# Patient Record
Sex: FEMALE | Race: WHITE | Employment: FULL TIME | URBAN - NONMETROPOLITAN AREA
[De-identification: names, ages, dates, MRNs, and addresses within clinical notes are randomized per-mention and may not be internally consistent; named-entity substitution may affect disease eponyms.]

---

## 2017-03-13 ENCOUNTER — HOSPITAL ENCOUNTER (OUTPATIENT)
Dept: WOMENS IMAGING | Age: 62
Discharge: HOME | End: 2017-03-13
Attending: NURSE PRACTITIONER | Admitting: NURSE PRACTITIONER
Payer: COMMERCIAL

## 2017-03-13 DIAGNOSIS — Z13.9 SCREENING: ICD-10-CM

## 2017-03-13 PROCEDURE — G0202 SCR MAMMO BI INCL CAD: HCPCS

## 2017-04-21 ENCOUNTER — TELEPHONE (OUTPATIENT)
Dept: GASTROENTEROLOGY | Age: 62
End: 2017-04-21

## 2017-04-21 DIAGNOSIS — Z12.11 COLON CANCER SCREENING: Primary | ICD-10-CM

## 2017-11-07 ENCOUNTER — ANESTHESIA (OUTPATIENT)
Dept: OPERATING ROOM | Age: 62
End: 2017-11-07
Payer: COMMERCIAL

## 2017-11-07 ENCOUNTER — ANESTHESIA EVENT (OUTPATIENT)
Dept: OPERATING ROOM | Age: 62
End: 2017-11-07
Payer: COMMERCIAL

## 2017-11-07 ENCOUNTER — HOSPITAL ENCOUNTER (OUTPATIENT)
Age: 62
Setting detail: OUTPATIENT SURGERY
Discharge: HOME | End: 2017-11-07
Attending: INTERNAL MEDICINE | Admitting: INTERNAL MEDICINE
Payer: COMMERCIAL

## 2017-11-07 VITALS — SYSTOLIC BLOOD PRESSURE: 106 MMHG | OXYGEN SATURATION: 98 % | DIASTOLIC BLOOD PRESSURE: 63 MMHG

## 2017-11-07 VITALS
TEMPERATURE: 98.5 F | WEIGHT: 255 LBS | BODY MASS INDEX: 48.15 KG/M2 | DIASTOLIC BLOOD PRESSURE: 71 MMHG | RESPIRATION RATE: 18 BRPM | SYSTOLIC BLOOD PRESSURE: 119 MMHG | HEART RATE: 62 BPM | HEIGHT: 61 IN | OXYGEN SATURATION: 96 %

## 2017-11-07 PROCEDURE — 7100000010 HC PHASE II RECOVERY - FIRST 15 MIN: Performed by: INTERNAL MEDICINE

## 2017-11-07 PROCEDURE — 3609027000 HC COLONOSCOPY: Performed by: INTERNAL MEDICINE

## 2017-11-07 PROCEDURE — 2580000003 HC RX 258: Performed by: INTERNAL MEDICINE

## 2017-11-07 PROCEDURE — 3700000001 HC ADD 15 MINUTES (ANESTHESIA): Performed by: INTERNAL MEDICINE

## 2017-11-07 PROCEDURE — 45378 DIAGNOSTIC COLONOSCOPY: CPT | Performed by: INTERNAL MEDICINE

## 2017-11-07 PROCEDURE — 6360000002 HC RX W HCPCS: Performed by: NURSE ANESTHETIST, CERTIFIED REGISTERED

## 2017-11-07 PROCEDURE — 3700000000 HC ANESTHESIA ATTENDED CARE: Performed by: INTERNAL MEDICINE

## 2017-11-07 PROCEDURE — 7100000011 HC PHASE II RECOVERY - ADDTL 15 MIN: Performed by: INTERNAL MEDICINE

## 2017-11-07 PROCEDURE — 2500000003 HC RX 250 WO HCPCS: Performed by: NURSE ANESTHETIST, CERTIFIED REGISTERED

## 2017-11-07 RX ORDER — PRAVASTATIN SODIUM 40 MG
40 TABLET ORAL DAILY
COMMUNITY

## 2017-11-07 RX ORDER — PROPOFOL 10 MG/ML
INJECTION, EMULSION INTRAVENOUS CONTINUOUS PRN
Status: DISCONTINUED | OUTPATIENT
Start: 2017-11-07 | End: 2017-11-07 | Stop reason: SDUPTHER

## 2017-11-07 RX ORDER — PROPOFOL 10 MG/ML
INJECTION, EMULSION INTRAVENOUS PRN
Status: DISCONTINUED | OUTPATIENT
Start: 2017-11-07 | End: 2017-11-07 | Stop reason: SDUPTHER

## 2017-11-07 RX ORDER — LIDOCAINE HYDROCHLORIDE 20 MG/ML
INJECTION, SOLUTION INFILTRATION; PERINEURAL PRN
Status: DISCONTINUED | OUTPATIENT
Start: 2017-11-07 | End: 2017-11-07 | Stop reason: SDUPTHER

## 2017-11-07 RX ORDER — SODIUM CHLORIDE, SODIUM LACTATE, POTASSIUM CHLORIDE, CALCIUM CHLORIDE 600; 310; 30; 20 MG/100ML; MG/100ML; MG/100ML; MG/100ML
INJECTION, SOLUTION INTRAVENOUS CONTINUOUS
Status: DISCONTINUED | OUTPATIENT
Start: 2017-11-07 | End: 2017-11-07 | Stop reason: HOSPADM

## 2017-11-07 RX ADMIN — SODIUM CHLORIDE, POTASSIUM CHLORIDE, SODIUM LACTATE AND CALCIUM CHLORIDE: 600; 310; 30; 20 INJECTION, SOLUTION INTRAVENOUS at 07:40

## 2017-11-07 RX ADMIN — PROPOFOL 50 MG: 10 INJECTION, EMULSION INTRAVENOUS at 07:57

## 2017-11-07 RX ADMIN — LIDOCAINE HYDROCHLORIDE 40 MG: 20 INJECTION, SOLUTION INFILTRATION; PERINEURAL at 07:57

## 2017-11-07 RX ADMIN — PROPOFOL 200 MCG/KG/MIN: 10 INJECTION, EMULSION INTRAVENOUS at 07:57

## 2017-11-07 ASSESSMENT — PAIN SCALES - GENERAL
PAINLEVEL_OUTOF10: 0

## 2017-11-07 NOTE — OP NOTE
PROCEDURE NOTE    DATE OF PROCEDURE: 11/7/2017    ENDOSCOPIST: Paula Lee. Jamarcus Storm MD, [facility]    ASSISTANT: None    PREOPERATIVE DIAGNOSIS: screening for colon cancer    POSTOPERATIVE DIAGNOSIS: hemorrhoids internal, Small in size    OPERATION: Colonoscopy --screening    ANESTHESIA: MAC     ESTIMATED BLOOD LOSS: No    COMPLICATIONS: None. SPECIMENS: were not obtained    HISTORY: The patient is a 62y.o. year old female with history of above preop diagnosis. Colonoscopy with possible biopsy or polypectomy has been recommended and I explained the risk, benefits, expected outcome, and alternatives to the procedure. Risks include but are not limited to bleeding, infection, respiratory distress, hypotension, and perforation of the colon. The patient understands and is in agreement. PROCEDURE: The patient was given monitored anesthesia care. The patient was given oxygen by nasal cannula. The colonoscope was inserted per rectum and advanced under direct vision to the cecum without difficulty. Findings:  Cecum/Ascending colon: normal    Transverse colon: normal    Descending/Sigmoid colon: normal    Rectum/Anus: examined in normal and retroflexed positions and was abnormal: hemorrhoids    The colon was decompressed and the scope was removed. The patient tolerated the procedure well. Current guidelines call for a repeat colonoscopy in 10 years.       Electronically signed by Elie Corbin MD  on 11/7/2017 at 8:19 AM

## 2017-11-07 NOTE — PROGRESS NOTES
Discharge Criteria    Inpatients must meet Criteria 1 through 7. All other patients are either YES or N/A. If a NO is chosen then Anesthesia or Surgeon must be notified. 1.  Minimum 30 minutes after last dose of sedative medication, minimum 120 minutes after last dose of reversal agent. Yes      2. Systolic BP stable within 20 mmHg for 30 minutes & systolic BP between 90 & 129 or within 10 mmHg of baseline. Yes      3. Pulse between 60 and 100 or within 10 bpm of baseline. Yes      4. Spontaneous respiratory rate >/= 10 per minute. Yes      5. SaO2 >/= 95 or  >/= baseline. Yes      6. Able to cough and swallow or return to baseline function. Yes      7. Alert and oriented or return to baseline mental status. Yes      8. Demonstrates controlled, coordinated movements, ambulates with steady gait, or return to baseline activity function. Yes      9. Minimal or no pain or nausea, or at a level tolerable and acceptable to patient. Yes      10. Takes and retains oral fluids as allowed. N/A      11. Procedural / perioperative site stable. Minimal or no bleeding. Yes          12. If GI endoscopy procedure, minimal or no abdominal distention or passing flatus. Yes      13. Written discharge instructions and emergency telephone number provided. Yes      14. Accompanied by a responsible adult. Yes      Adult patient discharged from facility without responsible person meets above criteria plus the following:   a) remains awake without stimulus for 30 minutes     b) oriented appropriate for age      c) all vital signs stable   d) no significant risk of losing protective reflexes      e) able to maintain pre-procedure mobility without assistance   f) no nausea or dizziness      g) transportation arrangements that do not require patient to operate motor Vehicle.      N/A

## 2017-11-07 NOTE — ANESTHESIA PRE PROCEDURE
Department of Anesthesiology  Preprocedure Note       Name:  Anuj Flanagan   Age:  62 y.o.  :  1955                                          MRN:  [id number]         Date:  2017      Surgeon: Darlin Mckeon):  Alcira Kraus MD    Procedure: Procedure(s):  COLONOSCOPY    Medications prior to admission:   Prior to Admission medications    Medication Sig Start Date End Date Taking? Authorizing Provider   pravastatin (PRAVACHOL) 40 MG tablet Take 40 mg by mouth daily   Yes Historical Provider, MD   Multiple Vitamins-Minerals (MULTIVITAMIN ADULT PO) Take by mouth daily   Yes Historical Provider, MD   chlorthalidone (HYGROTON) 25 MG tablet Take 25 mg by mouth daily   Yes Historical Provider, MD   atenolol (TENORMIN) 50 MG tablet Take 50 mg by mouth daily   Yes Historical Provider, MD   citalopram (CELEXA) 40 MG tablet Take 40 mg by mouth daily   Yes Historical Provider, MD   omeprazole (PRILOSEC) 20 MG capsule Take 20 mg by mouth Daily   Yes Historical Provider, MD   levothyroxine (SYNTHROID) 150 MCG tablet Take 150 mcg by mouth daily   Yes Historical Provider, MD   metFORMIN (GLUCOPHAGE) 500 MG tablet Take 500 mg by mouth 2 times daily (with meals)   Yes Historical Provider, MD   loratadine (CLARITIN) 10 MG tablet Take 10 mg by mouth daily    Historical Provider, MD   tiZANidine (ZANAFLEX) 4 MG tablet Take 4 mg by mouth every 6 hours as needed    Historical Provider, MD       Current medications:    Current Facility-Administered Medications   Medication Dose Route Frequency Provider Last Rate Last Dose    lactated ringers infusion   Intravenous Continuous Alcira Kraus MD           Allergies:     Allergies   Allergen Reactions    Amoxicillin     Ampicillin     Bactrim [Sulfamethoxazole-Trimethoprim]     Sulfa Antibiotics     Trimethoprim        Problem List:    Patient Active Problem List   Diagnosis Code    Colon cancer screening Z12.11       Past Medical History:        Diagnosis Date    Arthritis  Hyperlipidemia     Thyroid disease        Past Surgical History:        Procedure Laterality Date    ARM SURGERY Right 1997       Social History:    Social History   Substance Use Topics    Smoking status: Never Smoker    Smokeless tobacco: Never Used    Alcohol use Yes      Comment: occasional                                Counseling given: Not Answered      Vital Signs (Current):   Vitals:    11/07/17 0730   BP: 120/81   Pulse: 72   Resp: 20   Temp: 36.1 °C (96.9 °F)   TempSrc: Temporal   SpO2: 95%   Weight: 255 lb (115.7 kg)   Height: 5' 1\" (1.549 m)                                              BP Readings from Last 3 Encounters:   11/07/17 120/81       NPO Status: Time of last liquid consumption: 0300                        Time of last solid consumption: 1730                        Date of last liquid consumption: 11/06/17                        Date of last solid food consumption: 11/04/17    BMI:   Wt Readings from Last 3 Encounters:   11/07/17 255 lb (115.7 kg)     Body mass index is 48.18 kg/m². CBC: No results found for: WBC, RBC, HGB, HCT, MCV, RDW, PLT    CMP: No results found for: NA, K, CL, CO2, BUN, CREATININE, GFRAA, AGRATIO, LABGLOM, GLUCOSE, PROT, CALCIUM, BILITOT, ALKPHOS, AST, ALT    POC Tests: No results for input(s): POCGLU, POCNA, POCK, POCCL, POCBUN, POCHEMO, POCHCT in the last 72 hours.     Coags: No results found for: PROTIME, INR, APTT    HCG (If Applicable): No results found for: PREGTESTUR, PREGSERUM, HCG, HCGQUANT     ABGs: No results found for: PHART, PO2ART, FFI0RJC, IUI7GTT, BEART, S3JLUSWK     Type & Screen (If Applicable):  No results found for: LABABO, [address]    Anesthesia Evaluation  Patient summary reviewed  Airway: Mallampati: II  TM distance: >3 FB   Neck ROM: full  Mouth opening: > = 3 FB Dental:          Pulmonary: breath sounds clear to auscultation                             Cardiovascular:  Exercise tolerance: good (>4 METS),           Rhythm: regular  Rate:

## 2017-11-07 NOTE — H&P
History and Physical    Patient's Name/Date of Birth: Judith Toussaint / 1955 (49 y.o.)    Date: November 7, 2017     CHIEF COMPLAINT:  screening for colon cancer      Past Medical History:   Diagnosis Date    Arthritis     Hyperlipidemia     Thyroid disease      Past Surgical History:   Procedure Laterality Date    ARM SURGERY Right 1997     Current Facility-Administered Medications   Medication Dose Route Frequency Provider Last Rate Last Dose    lactated ringers infusion   Intravenous Continuous Bridget Gray MD         Allergies   Allergen Reactions    Amoxicillin     Ampicillin     Bactrim [Sulfamethoxazole-Trimethoprim]     Sulfa Antibiotics     Trimethoprim      History reviewed. No pertinent family history. Social History     Social History    Marital status:      Spouse name: N/A    Number of children: N/A    Years of education: N/A     Occupational History    Not on file. Social History Main Topics    Smoking status: Never Smoker    Smokeless tobacco: Never Used    Alcohol use Yes      Comment: occasional    Drug use: No    Sexual activity: Not on file     Other Topics Concern    Not on file     Social History Narrative    No narrative on file     ROS: Non-contributory    Physical Exam:  Vitals:    11/07/17 0730   BP: 120/81   Pulse: 72   Resp: 20   Temp: 96.9 °F (36.1 °C)   SpO2: 95%       Chest: Breath sounds were clear and equal with no rales, wheezes, or rhonchi. Respiratory effort was normal with no retractions or use of accessory muscles. Cardiovascular: Heart sounds were normal with a regular rate and rhythm. There were no murmurs, gallops or rubs. Abdomen: Bowel sounds were normal.  The abdomen was soft and non distended. There was no tenderness, guarding, rebound, or rigidity. There were no masses, hepatosplenomegaly, or hernias.     Plan: Colonoscopy      Electronically by Bridget Gray MD  on 11/7/2017 at 7:42 AM

## 2018-01-22 ENCOUNTER — HOSPITAL ENCOUNTER (OUTPATIENT)
Dept: NON INVASIVE DIAGNOSTICS | Age: 63
Discharge: HOME | End: 2018-01-22
Payer: COMMERCIAL

## 2018-01-22 LAB
LV EF: 60 %
LVEF MODALITY: NORMAL

## 2018-01-22 PROCEDURE — 93306 TTE W/DOPPLER COMPLETE: CPT

## 2018-03-19 ENCOUNTER — HOSPITAL ENCOUNTER (OUTPATIENT)
Dept: WOMENS IMAGING | Age: 63
Discharge: HOME | End: 2018-03-21
Payer: COMMERCIAL

## 2018-03-19 DIAGNOSIS — Z12.39 SCREENING BREAST EXAMINATION: ICD-10-CM

## 2018-03-19 PROCEDURE — 77067 SCR MAMMO BI INCL CAD: CPT

## 2019-04-15 ENCOUNTER — HOSPITAL ENCOUNTER (OUTPATIENT)
Dept: WOMENS IMAGING | Age: 64
Discharge: HOME | End: 2019-04-17
Payer: COMMERCIAL

## 2019-04-15 ENCOUNTER — HOSPITAL ENCOUNTER (OUTPATIENT)
Dept: BONE DENSITY | Age: 64
Discharge: HOME | End: 2019-04-17
Payer: COMMERCIAL

## 2019-04-15 DIAGNOSIS — Z78.0 MENOPAUSE: ICD-10-CM

## 2019-04-15 DIAGNOSIS — Z12.39 BREAST CANCER SCREENING OTHER THAN MAMMOGRAM: ICD-10-CM

## 2019-04-15 PROCEDURE — 77063 BREAST TOMOSYNTHESIS BI: CPT

## 2019-04-15 PROCEDURE — 77080 DXA BONE DENSITY AXIAL: CPT

## 2020-05-26 ENCOUNTER — HOSPITAL ENCOUNTER (OUTPATIENT)
Dept: WOMENS IMAGING | Age: 65
Discharge: HOME | End: 2020-05-28
Payer: COMMERCIAL

## 2020-05-26 PROCEDURE — 77067 SCR MAMMO BI INCL CAD: CPT

## 2020-10-26 ENCOUNTER — HOSPITAL ENCOUNTER (OUTPATIENT)
Dept: LAB | Age: 65
Discharge: HOME | End: 2020-10-26
Payer: COMMERCIAL

## 2020-10-26 LAB
ABSOLUTE EOS #: 0.17 K/UL (ref 0–0.44)
ABSOLUTE IMMATURE GRANULOCYTE: 0.03 K/UL (ref 0–0.3)
ABSOLUTE LYMPH #: 1.86 K/UL (ref 1.1–3.7)
ABSOLUTE MONO #: 0.6 K/UL (ref 0.1–1.2)
ALBUMIN SERPL-MCNC: 4 G/DL (ref 3.5–5.2)
ALBUMIN/GLOBULIN RATIO: 1.3 (ref 1–2.5)
ALP BLD-CCNC: 74 U/L (ref 35–104)
ALT SERPL-CCNC: 15 U/L (ref 5–33)
ANION GAP SERPL CALCULATED.3IONS-SCNC: 11 MMOL/L (ref 9–17)
AST SERPL-CCNC: 19 U/L
BASOPHILS # BLD: 1 % (ref 0–2)
BASOPHILS ABSOLUTE: 0.06 K/UL (ref 0–0.2)
BILIRUB SERPL-MCNC: 0.51 MG/DL (ref 0.3–1.2)
BUN BLDV-MCNC: 15 MG/DL (ref 8–23)
BUN/CREAT BLD: 29 (ref 9–20)
CALCIUM SERPL-MCNC: 8.8 MG/DL (ref 8.6–10.4)
CHLORIDE BLD-SCNC: 102 MMOL/L (ref 98–107)
CHOLESTEROL/HDL RATIO: 3.5
CHOLESTEROL: 167 MG/DL
CO2: 28 MMOL/L (ref 20–31)
CREAT SERPL-MCNC: 0.52 MG/DL (ref 0.5–0.9)
DIFFERENTIAL TYPE: ABNORMAL
EOSINOPHILS RELATIVE PERCENT: 2 % (ref 1–4)
FOLATE: 18.8 NG/ML
GFR AFRICAN AMERICAN: >60 ML/MIN
GFR NON-AFRICAN AMERICAN: >60 ML/MIN
GFR SERPL CREATININE-BSD FRML MDRD: ABNORMAL ML/MIN/{1.73_M2}
GFR SERPL CREATININE-BSD FRML MDRD: ABNORMAL ML/MIN/{1.73_M2}
GLUCOSE BLD-MCNC: 101 MG/DL (ref 70–99)
HCT VFR BLD CALC: 42.1 % (ref 36.3–47.1)
HDLC SERPL-MCNC: 48 MG/DL
HEMOGLOBIN: 12.8 G/DL (ref 11.9–15.1)
IMMATURE GRANULOCYTES: 0 %
LDL CHOLESTEROL: 80 MG/DL (ref 0–130)
LYMPHOCYTES # BLD: 21 % (ref 24–43)
MAGNESIUM: 1.9 MG/DL (ref 1.6–2.6)
MCH RBC QN AUTO: 26.8 PG (ref 25.2–33.5)
MCHC RBC AUTO-ENTMCNC: 30.4 G/DL (ref 28.4–34.8)
MCV RBC AUTO: 88.1 FL (ref 82.6–102.9)
MONOCYTES # BLD: 7 % (ref 3–12)
NRBC AUTOMATED: 0 PER 100 WBC
PDW BLD-RTO: 14.7 % (ref 11.8–14.4)
PLATELET # BLD: 362 K/UL (ref 138–453)
PLATELET ESTIMATE: ABNORMAL
PMV BLD AUTO: 11 FL (ref 8.1–13.5)
POTASSIUM SERPL-SCNC: 3.6 MMOL/L (ref 3.7–5.3)
RBC # BLD: 4.78 M/UL (ref 3.95–5.11)
RBC # BLD: ABNORMAL 10*6/UL
SEG NEUTROPHILS: 69 % (ref 36–65)
SEGMENTED NEUTROPHILS ABSOLUTE COUNT: 6.05 K/UL (ref 1.5–8.1)
SODIUM BLD-SCNC: 141 MMOL/L (ref 135–144)
THYROXINE, FREE: 1.66 NG/DL (ref 0.93–1.7)
TOTAL PROTEIN: 7.2 G/DL (ref 6.4–8.3)
TRIGL SERPL-MCNC: 196 MG/DL
TSH SERPL DL<=0.05 MIU/L-ACNC: 0.91 MIU/L (ref 0.3–5)
VITAMIN B-12: 1292 PG/ML (ref 232–1245)
VLDLC SERPL CALC-MCNC: ABNORMAL MG/DL (ref 1–30)
WBC # BLD: 8.8 K/UL (ref 3.5–11.3)
WBC # BLD: ABNORMAL 10*3/UL

## 2020-10-26 PROCEDURE — 83735 ASSAY OF MAGNESIUM: CPT

## 2020-10-26 PROCEDURE — 82746 ASSAY OF FOLIC ACID SERUM: CPT

## 2020-10-26 PROCEDURE — 84443 ASSAY THYROID STIM HORMONE: CPT

## 2020-10-26 PROCEDURE — 85025 COMPLETE CBC W/AUTO DIFF WBC: CPT

## 2020-10-26 PROCEDURE — 36415 COLL VENOUS BLD VENIPUNCTURE: CPT

## 2020-10-26 PROCEDURE — 80061 LIPID PANEL: CPT

## 2020-10-26 PROCEDURE — 80053 COMPREHEN METABOLIC PANEL: CPT

## 2020-10-26 PROCEDURE — 82607 VITAMIN B-12: CPT

## 2020-10-26 PROCEDURE — 84439 ASSAY OF FREE THYROXINE: CPT

## 2020-11-18 ENCOUNTER — HOSPITAL ENCOUNTER (OUTPATIENT)
Dept: LAB | Age: 65
Setting detail: SPECIMEN
Discharge: HOME | End: 2020-11-18
Payer: COMMERCIAL

## 2020-11-18 PROCEDURE — C9803 HOPD COVID-19 SPEC COLLECT: HCPCS

## 2020-11-18 PROCEDURE — U0003 INFECTIOUS AGENT DETECTION BY NUCLEIC ACID (DNA OR RNA); SEVERE ACUTE RESPIRATORY SYNDROME CORONAVIRUS 2 (SARS-COV-2) (CORONAVIRUS DISEASE [COVID-19]), AMPLIFIED PROBE TECHNIQUE, MAKING USE OF HIGH THROUGHPUT TECHNOLOGIES AS DESCRIBED BY CMS-2020-01-R: HCPCS

## 2020-11-22 LAB — SARS-COV-2, NAA: DETECTED

## 2020-11-23 ENCOUNTER — TELEPHONE (OUTPATIENT)
Dept: PEDIATRICS | Age: 65
End: 2020-11-23

## 2020-11-23 ENCOUNTER — TELEPHONE (OUTPATIENT)
Dept: ADMINISTRATIVE | Age: 65
End: 2020-11-23

## 2021-05-27 ENCOUNTER — HOSPITAL ENCOUNTER (OUTPATIENT)
Dept: WOMENS IMAGING | Age: 66
Discharge: HOME | End: 2021-05-29
Payer: COMMERCIAL

## 2021-05-27 DIAGNOSIS — Z12.31 BREAST CANCER SCREENING BY MAMMOGRAM: ICD-10-CM

## 2021-05-27 PROCEDURE — 77063 BREAST TOMOSYNTHESIS BI: CPT

## 2022-03-15 ENCOUNTER — HOSPITAL ENCOUNTER (OUTPATIENT)
Dept: ULTRASOUND IMAGING | Age: 67
Discharge: HOME | End: 2022-03-17
Payer: MEDICARE

## 2022-03-15 ENCOUNTER — HOSPITAL ENCOUNTER (OUTPATIENT)
Age: 67
Discharge: HOME | End: 2022-03-15
Payer: MEDICARE

## 2022-03-15 DIAGNOSIS — Z00.00 ROUTINE GENERAL MEDICAL EXAMINATION AT A HEALTH CARE FACILITY: ICD-10-CM

## 2022-03-15 PROCEDURE — 93005 ELECTROCARDIOGRAM TRACING: CPT

## 2022-03-15 PROCEDURE — 76706 US ABDL AORTA SCREEN AAA: CPT

## 2022-03-16 LAB
EKG ATRIAL RATE: 67 BPM
EKG P AXIS: 17 DEGREES
EKG P-R INTERVAL: 132 MS
EKG Q-T INTERVAL: 426 MS
EKG QRS DURATION: 88 MS
EKG QTC CALCULATION (BAZETT): 450 MS
EKG R AXIS: -15 DEGREES
EKG T AXIS: 21 DEGREES
EKG VENTRICULAR RATE: 67 BPM

## 2022-06-06 ENCOUNTER — HOSPITAL ENCOUNTER (OUTPATIENT)
Dept: WOMENS IMAGING | Age: 67
Discharge: HOME | End: 2022-06-08
Payer: MEDICARE

## 2022-06-06 DIAGNOSIS — Z12.31 BREAST CANCER SCREENING BY MAMMOGRAM: ICD-10-CM

## 2022-06-06 PROCEDURE — 77063 BREAST TOMOSYNTHESIS BI: CPT

## 2023-06-08 ENCOUNTER — HOSPITAL ENCOUNTER (OUTPATIENT)
Dept: WOMENS IMAGING | Age: 68
Discharge: HOME | End: 2023-06-10
Payer: MEDICARE

## 2023-06-08 DIAGNOSIS — Z12.31 BREAST CANCER SCREENING BY MAMMOGRAM: ICD-10-CM

## 2023-06-08 PROCEDURE — 77063 BREAST TOMOSYNTHESIS BI: CPT

## 2024-04-24 ENCOUNTER — HOSPITAL ENCOUNTER (OUTPATIENT)
Dept: PREADMISSION TESTING | Age: 69
Discharge: HOME | End: 2024-04-24
Attending: ORTHOPAEDIC SURGERY | Admitting: ORTHOPAEDIC SURGERY
Payer: COMMERCIAL

## 2024-04-24 VITALS
BODY MASS INDEX: 49.09 KG/M2 | OXYGEN SATURATION: 96 % | SYSTOLIC BLOOD PRESSURE: 121 MMHG | HEIGHT: 61 IN | RESPIRATION RATE: 18 BRPM | TEMPERATURE: 97.1 F | WEIGHT: 260 LBS | HEART RATE: 68 BPM | DIASTOLIC BLOOD PRESSURE: 69 MMHG

## 2024-04-24 DIAGNOSIS — Z01.818 PREOP TESTING: Primary | ICD-10-CM

## 2024-04-24 LAB
ABO + RH BLD: NORMAL
ANION GAP SERPL CALCULATED.3IONS-SCNC: 12 MMOL/L (ref 9–17)
ARM BAND NUMBER: NORMAL
BASOPHILS # BLD: 0.05 K/UL (ref 0–0.2)
BASOPHILS NFR BLD: 1 % (ref 0–2)
BLOOD BANK SAMPLE EXPIRATION: NORMAL
BLOOD GROUP ANTIBODIES SERPL: NEGATIVE
BUN SERPL-MCNC: 16 MG/DL (ref 8–23)
BUN/CREAT SERPL: 32 (ref 9–20)
CALCIUM SERPL-MCNC: 9.1 MG/DL (ref 8.6–10.4)
CHLORIDE SERPL-SCNC: 98 MMOL/L (ref 98–107)
CO2 SERPL-SCNC: 29 MMOL/L (ref 20–31)
CREAT SERPL-MCNC: 0.5 MG/DL (ref 0.5–0.9)
EOSINOPHIL # BLD: 0.12 K/UL (ref 0–0.44)
EOSINOPHILS RELATIVE PERCENT: 1 % (ref 1–4)
ERYTHROCYTE [DISTWIDTH] IN BLOOD BY AUTOMATED COUNT: 14.2 % (ref 11.8–14.4)
GFR SERPL CREATININE-BSD FRML MDRD: >90 ML/MIN/1.73M2
GLUCOSE SERPL-MCNC: 109 MG/DL (ref 70–99)
HCT VFR BLD AUTO: 45 % (ref 36.3–47.1)
HGB BLD-MCNC: 14.3 G/DL (ref 11.9–15.1)
IMM GRANULOCYTES # BLD AUTO: 0.03 K/UL (ref 0–0.3)
IMM GRANULOCYTES NFR BLD: 0 %
LYMPHOCYTES NFR BLD: 2.08 K/UL (ref 1.1–3.7)
LYMPHOCYTES RELATIVE PERCENT: 25 % (ref 24–43)
MCH RBC QN AUTO: 27.9 PG (ref 25.2–33.5)
MCHC RBC AUTO-ENTMCNC: 31.8 G/DL (ref 28.4–34.8)
MCV RBC AUTO: 87.9 FL (ref 82.6–102.9)
MONOCYTES NFR BLD: 0.58 K/UL (ref 0.1–1.2)
MONOCYTES NFR BLD: 7 % (ref 3–12)
NEUTROPHILS NFR BLD: 66 % (ref 36–65)
NEUTS SEG NFR BLD: 5.55 K/UL (ref 1.5–8.1)
NRBC BLD-RTO: 0 PER 100 WBC
PLATELET # BLD AUTO: 417 K/UL (ref 138–453)
PMV BLD AUTO: 11.2 FL (ref 8.1–13.5)
POTASSIUM SERPL-SCNC: 3.7 MMOL/L (ref 3.7–5.3)
RBC # BLD AUTO: 5.12 M/UL (ref 3.95–5.11)
SODIUM SERPL-SCNC: 139 MMOL/L (ref 135–144)
WBC OTHER # BLD: 8.4 K/UL (ref 3.5–11.3)

## 2024-04-24 PROCEDURE — 93005 ELECTROCARDIOGRAM TRACING: CPT | Performed by: ORTHOPAEDIC SURGERY

## 2024-04-24 PROCEDURE — 86850 RBC ANTIBODY SCREEN: CPT

## 2024-04-24 PROCEDURE — 86901 BLOOD TYPING SEROLOGIC RH(D): CPT

## 2024-04-24 PROCEDURE — 85025 COMPLETE CBC W/AUTO DIFF WBC: CPT

## 2024-04-24 PROCEDURE — 36415 COLL VENOUS BLD VENIPUNCTURE: CPT

## 2024-04-24 PROCEDURE — 86900 BLOOD TYPING SEROLOGIC ABO: CPT

## 2024-04-24 PROCEDURE — 87641 MR-STAPH DNA AMP PROBE: CPT

## 2024-04-24 PROCEDURE — 80048 BASIC METABOLIC PNL TOTAL CA: CPT

## 2024-04-24 RX ORDER — IBUPROFEN 200 MG
200 TABLET ORAL EVERY 8 HOURS PRN
COMMUNITY

## 2024-04-24 RX ORDER — LEVOTHYROXINE SODIUM 137 UG/1
137 TABLET ORAL DAILY
COMMUNITY

## 2024-04-24 RX ORDER — ACETAMINOPHEN 325 MG/1
650 TABLET ORAL ONCE
Status: CANCELLED | OUTPATIENT
Start: 2024-04-24 | End: 2024-04-24

## 2024-04-24 RX ORDER — TRANEXAMIC ACID 650 MG/1
1300 TABLET ORAL ONCE
Status: CANCELLED | OUTPATIENT
Start: 2024-04-24 | End: 2024-04-24

## 2024-04-24 RX ORDER — SODIUM CHLORIDE, SODIUM LACTATE, POTASSIUM CHLORIDE, CALCIUM CHLORIDE 600; 310; 30; 20 MG/100ML; MG/100ML; MG/100ML; MG/100ML
INJECTION, SOLUTION INTRAVENOUS CONTINUOUS
Status: CANCELLED | OUTPATIENT
Start: 2024-04-24

## 2024-04-24 ASSESSMENT — PAIN DESCRIPTION - PAIN TYPE: TYPE: CHRONIC PAIN

## 2024-04-24 ASSESSMENT — PAIN SCALES - GENERAL: PAINLEVEL_OUTOF10: 7

## 2024-04-24 NOTE — PROGRESS NOTES
[facility]   Preadmission Testing    Name: Litzy Lou  : 1955  Patient Phone: [phone] (home) [phone] (work)    Procedure right TKA  Date of Procedure: 24  Surgeon: Elder Montelongo MD    Ht:  154.9 cm (5' 1\")  Wt: 117.9 kg (260 lb)  Wt method: Actual    Allergies:   Allergies   Allergen Reactions    Amoxicillin     Ampicillin     Bactrim [Sulfamethoxazole-Trimethoprim]     Sulfa Antibiotics     Trimethoprim        Peanut allergy: No         Vitals:    24 1319   BP: 121/69   Pulse: 68   Resp: 18   Temp: 97.1 °F (36.2 °C)   SpO2: 96%       No LMP recorded. Patient is postmenopausal.    Do you take blood thinners?   [x] Yes    [] No         Instructed to stop blood thinners prior to procedure?    [x] Yes    [] No      [] N/A   Do you have sleep apnea?   [] Yes    [x] No     Instructed to bring CPAP machine?   [] Yes    [] No    [x] N/A   Do you have acid reflux ?   [x] Yes    [] No     Do you have  hiatal hernia?   [] Yes    [x] No    Do you ever experience motion sickness?   [] Yes    [x] No     Have you had a respiratory infection or sore throat in last 4 weeks before surgery?    [] Yes    [x] No     Do you have poorly controlled asthma or COPD?   [] Yes    [x] No     Do you have a history of angina in the last month or symptomatic arrhythmia?   [] Yes    [x] No     Do you have significant central nervous system disease?   [] Yes    [x] No     Have you had an EKG, labs, or chest xray in last 12 months?  If yes provide copies to anesthesia   [] Yes    [x] No       [] Lab    [] EKG    [] CXR     Have you had a stress test?     [] Yes    [x] No    When/where:    Was it normal?    [] Yes    [] No   Do you or your family have a history of Malignant Hyperthermia? [] Yes    [x] No     Patient instructed on:   [x] NPO Status   [x] Meds to Take Day of Surgery  [x] Ride Home  [x]No Jewelry/Contact Lenses/Dentures day of surgery   [x] Chlorhexidene             PAT Call/Visit

## 2024-04-25 ENCOUNTER — ANESTHESIA EVENT (OUTPATIENT)
Dept: OPERATING ROOM | Age: 69
End: 2024-04-25
Payer: COMMERCIAL

## 2024-04-25 ENCOUNTER — TELEPHONE (OUTPATIENT)
Dept: PREADMISSION TESTING | Age: 69
End: 2024-04-25

## 2024-04-25 ENCOUNTER — HOSPITAL ENCOUNTER (OUTPATIENT)
Dept: PHYSICAL THERAPY | Age: 69
Setting detail: THERAPIES SERIES
Discharge: HOME | End: 2024-04-25
Payer: COMMERCIAL

## 2024-04-25 LAB
EKG ATRIAL RATE: 64 BPM
EKG P-R INTERVAL: 134 MS
EKG Q-T INTERVAL: 436 MS
EKG QRS DURATION: 88 MS
EKG QTC CALCULATION (BAZETT): 449 MS
EKG R AXIS: -19 DEGREES
EKG T AXIS: -3 DEGREES
EKG VENTRICULAR RATE: 64 BPM

## 2024-04-25 PROCEDURE — 93010 ELECTROCARDIOGRAM REPORT: CPT | Performed by: INTERNAL MEDICINE

## 2024-04-25 PROCEDURE — 97116 GAIT TRAINING THERAPY: CPT

## 2024-04-25 NOTE — TELEPHONE ENCOUNTER
Please review progress note on 4/18/24 from POLO Galloway LPN. Patient is scheduled 5/2/24 with Dr. Montelongo. Thank you.

## 2024-04-25 NOTE — PROGRESS NOTES
Phone: [phone]        Fax: [phone]  [facility]  Physical Therapy  Gait Training/Discharge Summary  Date: 2024    Patient Name: Litzy Lou          : 1955  (68 y.o.)  [facility] #: [id number]    Referring Physician: Elder Montelongo MD     Diagnosis: Z01.818 - preoperative testing    Reason for Referral:  [] Crutch Training   [x] Walker Training   [] Other:    Objective Assessment:    [x]  Strength of uninvolved extremities are all within functional limits   []  Other:      Weight Bearing Status:  [x] Right Extremity  [] Left Extremity  [] NWB  [] PWB    [x] WBAT  [] TTWB    Treatment:  [x] Instructed in appropriate gait with crutches/walker  [x]   Crutches/walker fitted to patient at accurate height  [x] Instructed on gait training at level ground  [x] Instructed on gait training with use of stairs  [x] Instructed on sit to stand utilizing crutches/walker  []   Other:     Home Exercise Program - Instructed Patient:   [x] Handout given regarding LE ROM / strengthening exercises      Treatment Goals:  [x]  Met []  Not Met 2024   [x] Patient will be independent crutch/walker training    Treatment Plan:   [x]  Gait training, as noted above    [x]  Exercise education, as stated above     Rehab Potential: []  Poor   []  Fair   [x]  Good   []  Excellent     If there are any questions regarding the plan of care, please do not hesitate to contact the center.   Thank you for your referral.      Therapist’s Signature:  Radha Castaneda PT, DPT            Date: 2024        To be completed by the referring physicians   By signing below, I agree to the above treatment plan.      Physician’s Signature:                        Date: 2024

## 2024-04-29 NOTE — TELEPHONE ENCOUNTER
I reviewed the note on 4/18/24 stating patient had complaints of a sinus infection and had not yet received her antibiotic. Will need to do another follow up call and check to see patient has had antibiotic and is free from sinus infection sxs before her surgery.

## 2024-04-29 NOTE — TELEPHONE ENCOUNTER
Spoke to Aspen at Dr. Montelongo office. Aspen will contact the patient to follow up and will obtain PCP clearance visit notes.

## 2024-04-29 NOTE — TELEPHONE ENCOUNTER
Patient completed antibiotics and is symptom free. Patient had PCP clearance appointment 4/26/24 and is cleared for surgery.

## 2024-04-30 ENCOUNTER — HOSPITAL ENCOUNTER (OUTPATIENT)
Age: 69
Discharge: HOME | End: 2024-04-30
Payer: COMMERCIAL

## 2024-04-30 DIAGNOSIS — Z01.818 PREOP TESTING: ICD-10-CM

## 2024-04-30 PROCEDURE — 87641 MR-STAPH DNA AMP PROBE: CPT

## 2024-05-01 LAB
MRSA, DNA, NASAL: NEGATIVE
SPECIMEN DESCRIPTION: NORMAL

## 2024-05-02 ENCOUNTER — HOSPITAL ENCOUNTER (OUTPATIENT)
Age: 69
Setting detail: OBSERVATION
Discharge: HOME | End: 2024-05-03
Attending: ORTHOPAEDIC SURGERY | Admitting: ORTHOPAEDIC SURGERY
Payer: COMMERCIAL

## 2024-05-02 ENCOUNTER — ANESTHESIA (OUTPATIENT)
Dept: OPERATING ROOM | Age: 69
End: 2024-05-02
Payer: COMMERCIAL

## 2024-05-02 DIAGNOSIS — Z96.651 STATUS POST TOTAL KNEE REPLACEMENT USING CEMENT, RIGHT: Primary | ICD-10-CM

## 2024-05-02 PROBLEM — E07.9 THYROID DISEASE: Status: ACTIVE | Noted: 2024-05-02

## 2024-05-02 PROBLEM — I10 ESSENTIAL HYPERTENSION: Status: ACTIVE | Noted: 2024-05-02

## 2024-05-02 PROBLEM — K21.9 ACID REFLUX: Status: ACTIVE | Noted: 2024-05-02

## 2024-05-02 PROCEDURE — 6370000000 HC RX 637 (ALT 250 FOR IP): Performed by: ORTHOPAEDIC SURGERY

## 2024-05-02 PROCEDURE — 3700000001 HC ADD 15 MINUTES (ANESTHESIA): Performed by: ORTHOPAEDIC SURGERY

## 2024-05-02 PROCEDURE — G0378 HOSPITAL OBSERVATION PER HR: HCPCS

## 2024-05-02 PROCEDURE — 7100000001 HC PACU RECOVERY - ADDTL 15 MIN: Performed by: ORTHOPAEDIC SURGERY

## 2024-05-02 PROCEDURE — 6360000002 HC RX W HCPCS: Performed by: ORTHOPAEDIC SURGERY

## 2024-05-02 PROCEDURE — A4216 STERILE WATER/SALINE, 10 ML: HCPCS | Performed by: NURSE ANESTHETIST, CERTIFIED REGISTERED

## 2024-05-02 PROCEDURE — 6370000000 HC RX 637 (ALT 250 FOR IP): Performed by: NURSE PRACTITIONER

## 2024-05-02 PROCEDURE — 2709999900 HC NON-CHARGEABLE SUPPLY: Performed by: ORTHOPAEDIC SURGERY

## 2024-05-02 PROCEDURE — 2580000003 HC RX 258: Performed by: ORTHOPAEDIC SURGERY

## 2024-05-02 PROCEDURE — 94761 N-INVAS EAR/PLS OXIMETRY MLT: CPT

## 2024-05-02 PROCEDURE — 7100000000 HC PACU RECOVERY - FIRST 15 MIN: Performed by: ORTHOPAEDIC SURGERY

## 2024-05-02 PROCEDURE — 2500000003 HC RX 250 WO HCPCS: Performed by: ORTHOPAEDIC SURGERY

## 2024-05-02 PROCEDURE — 3700000000 HC ANESTHESIA ATTENDED CARE: Performed by: ORTHOPAEDIC SURGERY

## 2024-05-02 PROCEDURE — 97161 PT EVAL LOW COMPLEX 20 MIN: CPT

## 2024-05-02 PROCEDURE — C1713 ANCHOR/SCREW BN/BN,TIS/BN: HCPCS | Performed by: ORTHOPAEDIC SURGERY

## 2024-05-02 PROCEDURE — 64447 NJX AA&/STRD FEMORAL NRV IMG: CPT | Performed by: NURSE ANESTHETIST, CERTIFIED REGISTERED

## 2024-05-02 PROCEDURE — 2580000003 HC RX 258: Performed by: NURSE ANESTHETIST, CERTIFIED REGISTERED

## 2024-05-02 PROCEDURE — 6360000002 HC RX W HCPCS: Performed by: NURSE ANESTHETIST, CERTIFIED REGISTERED

## 2024-05-02 PROCEDURE — C1776 JOINT DEVICE (IMPLANTABLE): HCPCS | Performed by: ORTHOPAEDIC SURGERY

## 2024-05-02 PROCEDURE — 97166 OT EVAL MOD COMPLEX 45 MIN: CPT

## 2024-05-02 PROCEDURE — 3600000005 HC SURGERY LEVEL 5 BASE: Performed by: ORTHOPAEDIC SURGERY

## 2024-05-02 PROCEDURE — 6370000000 HC RX 637 (ALT 250 FOR IP)

## 2024-05-02 PROCEDURE — 3600000015 HC SURGERY LEVEL 5 ADDTL 15MIN: Performed by: ORTHOPAEDIC SURGERY

## 2024-05-02 DEVICE — CEMENT BNE 40GM HI VISC RADPQ FOR REV SURG: Type: IMPLANTABLE DEVICE | Site: KNEE | Status: FUNCTIONAL

## 2024-05-02 DEVICE — IMPL KNEE PSN FEM CR CMT CCR STD SZ7 R: Type: IMPLANTABLE DEVICE | Site: KNEE | Status: FUNCTIONAL

## 2024-05-02 DEVICE — IMPLANTABLE DEVICE: Type: IMPLANTABLE DEVICE | Site: KNEE | Status: FUNCTIONAL

## 2024-05-02 DEVICE — COMPONENT PAT DIA32MM THK8.5MM KNEE POLY CEM CONVENTIONAL: Type: IMPLANTABLE DEVICE | Site: KNEE | Status: FUNCTIONAL

## 2024-05-02 DEVICE — EXTENSION STEM L30MM DIA14MM KNEE TAPR CEM PERSONA: Type: IMPLANTABLE DEVICE | Site: KNEE | Status: FUNCTIONAL

## 2024-05-02 DEVICE — PSN TIB STM 5 DEG SZ E R: Type: IMPLANTABLE DEVICE | Site: KNEE | Status: FUNCTIONAL

## 2024-05-02 RX ORDER — SODIUM CHLORIDE, SODIUM LACTATE, POTASSIUM CHLORIDE, CALCIUM CHLORIDE 600; 310; 30; 20 MG/100ML; MG/100ML; MG/100ML; MG/100ML
INJECTION, SOLUTION INTRAVENOUS CONTINUOUS
Status: DISCONTINUED | OUTPATIENT
Start: 2024-05-02 | End: 2024-05-02

## 2024-05-02 RX ORDER — BUPIVACAINE/KETOROLAC/KETAMINE 150-60/50
SYRINGE (ML) INJECTION
Status: DISPENSED
Start: 2024-05-02 | End: 2024-05-03

## 2024-05-02 RX ORDER — SODIUM CHLORIDE 9 MG/ML
INJECTION, SOLUTION INTRAMUSCULAR; INTRAVENOUS; SUBCUTANEOUS PRN
Status: DISCONTINUED | OUTPATIENT
Start: 2024-05-02 | End: 2024-05-02 | Stop reason: SDUPTHER

## 2024-05-02 RX ORDER — CEFAZOLIN SODIUM IN 0.9 % NACL 2 G/100 ML
2000 PLASTIC BAG, INJECTION (ML) INTRAVENOUS ONCE
Status: COMPLETED | OUTPATIENT
Start: 2024-05-02 | End: 2024-05-03

## 2024-05-02 RX ORDER — HYDROCODONE BITARTRATE AND ACETAMINOPHEN 5; 325 MG/1; MG/1
1 TABLET ORAL EVERY 6 HOURS PRN
Status: DISCONTINUED | OUTPATIENT
Start: 2024-05-02 | End: 2024-05-03 | Stop reason: HOSPADM

## 2024-05-02 RX ORDER — GABAPENTIN 300 MG/1
300 CAPSULE ORAL ONCE
Status: COMPLETED | OUTPATIENT
Start: 2024-05-02 | End: 2024-05-02

## 2024-05-02 RX ORDER — ACETAMINOPHEN 325 MG/1
650 TABLET ORAL EVERY 6 HOURS PRN
Status: DISCONTINUED | OUTPATIENT
Start: 2024-05-02 | End: 2024-05-03 | Stop reason: HOSPADM

## 2024-05-02 RX ORDER — CHLORTHALIDONE 25 MG/1
25 TABLET ORAL DAILY
Status: DISCONTINUED | OUTPATIENT
Start: 2024-05-02 | End: 2024-05-03 | Stop reason: HOSPADM

## 2024-05-02 RX ORDER — CITALOPRAM 20 MG/1
20 TABLET ORAL DAILY
Status: DISCONTINUED | OUTPATIENT
Start: 2024-05-03 | End: 2024-05-03 | Stop reason: HOSPADM

## 2024-05-02 RX ORDER — DIMENHYDRINATE 50 MG
50 TABLET ORAL ONCE
Status: COMPLETED | OUTPATIENT
Start: 2024-05-02 | End: 2024-05-02

## 2024-05-02 RX ORDER — BUPIVACAINE/KETOROLAC/KETAMINE 150-60/50
SYRINGE (ML) INJECTION PRN
Status: DISCONTINUED | OUTPATIENT
Start: 2024-05-02 | End: 2024-05-02 | Stop reason: ALTCHOICE

## 2024-05-02 RX ORDER — METFORMIN HYDROCHLORIDE 500 MG/1
500 TABLET, EXTENDED RELEASE ORAL 2 TIMES DAILY WITH MEALS
Status: DISCONTINUED | OUTPATIENT
Start: 2024-05-02 | End: 2024-05-03 | Stop reason: HOSPADM

## 2024-05-02 RX ORDER — TIZANIDINE 4 MG/1
4 TABLET ORAL EVERY 6 HOURS PRN
Status: DISCONTINUED | OUTPATIENT
Start: 2024-05-02 | End: 2024-05-03 | Stop reason: HOSPADM

## 2024-05-02 RX ORDER — MIDAZOLAM HYDROCHLORIDE 1 MG/ML
INJECTION INTRAMUSCULAR; INTRAVENOUS PRN
Status: DISCONTINUED | OUTPATIENT
Start: 2024-05-02 | End: 2024-05-02 | Stop reason: SDUPTHER

## 2024-05-02 RX ORDER — SODIUM CHLORIDE 0.9 % (FLUSH) 0.9 %
5-40 SYRINGE (ML) INJECTION EVERY 12 HOURS SCHEDULED
Status: DISCONTINUED | OUTPATIENT
Start: 2024-05-02 | End: 2024-05-03 | Stop reason: HOSPADM

## 2024-05-02 RX ORDER — FENTANYL CITRATE 50 UG/ML
INJECTION, SOLUTION INTRAMUSCULAR; INTRAVENOUS PRN
Status: DISCONTINUED | OUTPATIENT
Start: 2024-05-02 | End: 2024-05-02 | Stop reason: SDUPTHER

## 2024-05-02 RX ORDER — MULTIVITAMIN WITH IRON
1 TABLET ORAL DAILY
Status: DISCONTINUED | OUTPATIENT
Start: 2024-05-03 | End: 2024-05-03 | Stop reason: HOSPADM

## 2024-05-02 RX ORDER — PANTOPRAZOLE SODIUM 40 MG/1
40 TABLET, DELAYED RELEASE ORAL
Status: DISCONTINUED | OUTPATIENT
Start: 2024-05-03 | End: 2024-05-03 | Stop reason: HOSPADM

## 2024-05-02 RX ORDER — SODIUM CHLORIDE 9 MG/ML
INJECTION, SOLUTION INTRAVENOUS PRN
Status: DISCONTINUED | OUTPATIENT
Start: 2024-05-02 | End: 2024-05-03 | Stop reason: HOSPADM

## 2024-05-02 RX ORDER — TRANEXAMIC ACID 650 MG/1
1300 TABLET ORAL ONCE
Status: COMPLETED | OUTPATIENT
Start: 2024-05-02 | End: 2024-05-02

## 2024-05-02 RX ORDER — FERROUS SULFATE 325(65) MG
325 TABLET ORAL
COMMUNITY

## 2024-05-02 RX ORDER — ENOXAPARIN SODIUM 100 MG/ML
30 INJECTION SUBCUTANEOUS 2 TIMES DAILY
Status: DISCONTINUED | OUTPATIENT
Start: 2024-05-03 | End: 2024-05-03 | Stop reason: HOSPADM

## 2024-05-02 RX ORDER — SODIUM CHLORIDE 0.9 % (FLUSH) 0.9 %
5-40 SYRINGE (ML) INJECTION PRN
Status: DISCONTINUED | OUTPATIENT
Start: 2024-05-02 | End: 2024-05-03 | Stop reason: HOSPADM

## 2024-05-02 RX ORDER — ACETAMINOPHEN 325 MG/1
650 TABLET ORAL EVERY 6 HOURS
Status: DISCONTINUED | OUTPATIENT
Start: 2024-05-02 | End: 2024-05-03 | Stop reason: HOSPADM

## 2024-05-02 RX ORDER — CEFAZOLIN SODIUM IN 0.9 % NACL 2 G/100 ML
2000 PLASTIC BAG, INJECTION (ML) INTRAVENOUS EVERY 8 HOURS
Status: COMPLETED | OUTPATIENT
Start: 2024-05-02 | End: 2024-05-03

## 2024-05-02 RX ORDER — METFORMIN HYDROCHLORIDE 500 MG/1
500 TABLET, EXTENDED RELEASE ORAL
COMMUNITY

## 2024-05-02 RX ORDER — VANCOMYCIN HYDROCHLORIDE 1 G/20ML
INJECTION, POWDER, LYOPHILIZED, FOR SOLUTION INTRAVENOUS PRN
Status: DISCONTINUED | OUTPATIENT
Start: 2024-05-02 | End: 2024-05-02 | Stop reason: ALTCHOICE

## 2024-05-02 RX ORDER — BUPIVACAINE HYDROCHLORIDE 7.5 MG/ML
INJECTION, SOLUTION INTRASPINAL PRN
Status: DISCONTINUED | OUTPATIENT
Start: 2024-05-02 | End: 2024-05-02 | Stop reason: SDUPTHER

## 2024-05-02 RX ORDER — ROPIVACAINE HYDROCHLORIDE 5 MG/ML
INJECTION, SOLUTION EPIDURAL; INFILTRATION; PERINEURAL PRN
Status: DISCONTINUED | OUTPATIENT
Start: 2024-05-02 | End: 2024-05-02 | Stop reason: SDUPTHER

## 2024-05-02 RX ORDER — PRAVASTATIN SODIUM 20 MG
40 TABLET ORAL NIGHTLY
Status: DISCONTINUED | OUTPATIENT
Start: 2024-05-02 | End: 2024-05-03 | Stop reason: HOSPADM

## 2024-05-02 RX ORDER — HYDROCODONE BITARTRATE AND ACETAMINOPHEN 5; 325 MG/1; MG/1
2 TABLET ORAL EVERY 6 HOURS PRN
Status: DISCONTINUED | OUTPATIENT
Start: 2024-05-02 | End: 2024-05-03 | Stop reason: HOSPADM

## 2024-05-02 RX ORDER — HYDROCODONE BITARTRATE AND ACETAMINOPHEN 5; 325 MG/1; MG/1
1 TABLET ORAL PRN
COMMUNITY
Start: 2024-03-08

## 2024-05-02 RX ORDER — ACETAMINOPHEN 325 MG/1
650 TABLET ORAL ONCE
Status: DISCONTINUED | OUTPATIENT
Start: 2024-05-02 | End: 2024-05-02 | Stop reason: SDUPTHER

## 2024-05-02 RX ORDER — ATENOLOL 50 MG/1
100 TABLET ORAL DAILY
Status: DISCONTINUED | OUTPATIENT
Start: 2024-05-03 | End: 2024-05-03 | Stop reason: HOSPADM

## 2024-05-02 RX ORDER — ACETAMINOPHEN 325 MG/1
650 TABLET ORAL ONCE
Status: COMPLETED | OUTPATIENT
Start: 2024-05-02 | End: 2024-05-02

## 2024-05-02 RX ORDER — PROPOFOL 10 MG/ML
INJECTION, EMULSION INTRAVENOUS CONTINUOUS PRN
Status: DISCONTINUED | OUTPATIENT
Start: 2024-05-02 | End: 2024-05-02 | Stop reason: SDUPTHER

## 2024-05-02 RX ORDER — DEXAMETHASONE SODIUM PHOSPHATE 10 MG/ML
INJECTION, SOLUTION INTRAMUSCULAR; INTRAVENOUS PRN
Status: DISCONTINUED | OUTPATIENT
Start: 2024-05-02 | End: 2024-05-02 | Stop reason: SDUPTHER

## 2024-05-02 RX ADMIN — SODIUM CHLORIDE, PRESERVATIVE FREE 10 ML: 5 INJECTION INTRAVENOUS at 20:00

## 2024-05-02 RX ADMIN — MIDAZOLAM 1 MG: 1 INJECTION INTRAMUSCULAR; INTRAVENOUS at 07:40

## 2024-05-02 RX ADMIN — ACETAMINOPHEN 650 MG: 325 TABLET ORAL at 12:44

## 2024-05-02 RX ADMIN — BUPIVACAINE HYDROCHLORIDE IN DEXTROSE 1.8 ML: 7.5 INJECTION, SOLUTION SUBARACHNOID at 08:28

## 2024-05-02 RX ADMIN — SODIUM CHLORIDE 10 ML: 9 INJECTION, SOLUTION INTRAMUSCULAR; INTRAVENOUS; SUBCUTANEOUS at 07:45

## 2024-05-02 RX ADMIN — FENTANYL CITRATE 50 MCG: 50 INJECTION, SOLUTION INTRAMUSCULAR; INTRAVENOUS at 07:40

## 2024-05-02 RX ADMIN — FENTANYL CITRATE 50 MCG: 50 INJECTION, SOLUTION INTRAMUSCULAR; INTRAVENOUS at 08:20

## 2024-05-02 RX ADMIN — ACETAMINOPHEN 650 MG: 325 TABLET ORAL at 18:55

## 2024-05-02 RX ADMIN — CEFAZOLIN 1000 MG: 1 INJECTION, POWDER, FOR SOLUTION INTRAMUSCULAR; INTRAVENOUS; PARENTERAL at 10:40

## 2024-05-02 RX ADMIN — DEXAMETHASONE SODIUM PHOSPHATE 10 MG: 10 INJECTION, SOLUTION INTRAMUSCULAR; INTRAVENOUS at 07:45

## 2024-05-02 RX ADMIN — HYDROCODONE BITARTRATE AND ACETAMINOPHEN 2 TABLET: 5; 325 TABLET ORAL at 23:34

## 2024-05-02 RX ADMIN — ACETAMINOPHEN 650 MG: 325 TABLET ORAL at 06:55

## 2024-05-02 RX ADMIN — Medication 2000 MG: at 23:40

## 2024-05-02 RX ADMIN — MIDAZOLAM 1 MG: 1 INJECTION INTRAMUSCULAR; INTRAVENOUS at 08:20

## 2024-05-02 RX ADMIN — SODIUM CHLORIDE, POTASSIUM CHLORIDE, SODIUM LACTATE AND CALCIUM CHLORIDE: 600; 310; 30; 20 INJECTION, SOLUTION INTRAVENOUS at 06:59

## 2024-05-02 RX ADMIN — ROPIVACAINE HYDROCHLORIDE 20 ML: 5 INJECTION EPIDURAL; INFILTRATION; PERINEURAL at 07:45

## 2024-05-02 RX ADMIN — PRAVASTATIN SODIUM 40 MG: 20 TABLET ORAL at 20:00

## 2024-05-02 RX ADMIN — TRANEXAMIC ACID 1300 MG: 650 TABLET ORAL at 19:57

## 2024-05-02 RX ADMIN — TRANEXAMIC ACID 1300 MG: 650 TABLET ORAL at 12:44

## 2024-05-02 RX ADMIN — GABAPENTIN 300 MG: 300 CAPSULE ORAL at 06:55

## 2024-05-02 RX ADMIN — DIMENHYDRINATE 50 MG: 50 TABLET ORAL at 06:55

## 2024-05-02 RX ADMIN — CEFAZOLIN 1000 MG: 1 INJECTION, POWDER, FOR SOLUTION INTRAMUSCULAR; INTRAVENOUS; PARENTERAL at 08:34

## 2024-05-02 RX ADMIN — PROPOFOL 100 MCG/KG/MIN: 10 INJECTION, EMULSION INTRAVENOUS at 08:32

## 2024-05-02 RX ADMIN — METFORMIN HYDROCHLORIDE 500 MG: 500 TABLET, EXTENDED RELEASE ORAL at 16:26

## 2024-05-02 RX ADMIN — Medication 2000 MG: at 08:17

## 2024-05-02 RX ADMIN — Medication 2000 MG: at 16:29

## 2024-05-02 RX ADMIN — TRANEXAMIC ACID 1300 MG: 650 TABLET ORAL at 06:55

## 2024-05-02 ASSESSMENT — PAIN - FUNCTIONAL ASSESSMENT
PAIN_FUNCTIONAL_ASSESSMENT: ACTIVITIES ARE NOT PREVENTED
PAIN_FUNCTIONAL_ASSESSMENT: 0-10

## 2024-05-02 ASSESSMENT — PAIN SCALES - GENERAL: PAINLEVEL_OUTOF10: 6

## 2024-05-02 ASSESSMENT — PAIN DESCRIPTION - DESCRIPTORS: DESCRIPTORS: ACHING

## 2024-05-02 ASSESSMENT — PAIN DESCRIPTION - ORIENTATION: ORIENTATION: RIGHT

## 2024-05-02 ASSESSMENT — LIFESTYLE VARIABLES: SMOKING_STATUS: 0

## 2024-05-02 ASSESSMENT — PAIN DESCRIPTION - LOCATION: LOCATION: KNEE

## 2024-05-02 NOTE — PROGRESS NOTES
Occupational Therapy  Facility/Department: [facility] MED SURG  Occupational Therapy Initial Assessment    Name: Litzy Lou  : 1955  MRN: [id number]  Date of Service: 2024    Discharge Recommendations:  Continue to assess pending progress          Patient Diagnosis(es): There were no encounter diagnoses.  Past Medical History:  has a past medical history of Acid reflux, Ankle edema, bilateral, Arthritis, Hyperlipidemia, MVP (mitral valve prolapse), and Thyroid disease.  Past Surgical History:  has a past surgical history that includes Arm Surgery (Right, ); pr colon ca scrn not hi rsk ind (N/A, 2017); Colonoscopy (2017); and Mole removal.    Treatment Diagnosis: Weakness      Assessment   Performance deficits / Impairments: Decreased functional mobility ;Decreased endurance;Decreased ADL status;Decreased balance;Decreased strength;Decreased high-level IADLs  Assessment: 67 y/o F admitted to [facility] for elective R TKA resulting in icnreased need for assist during ADL. Patient would benefit from OT services to address and educate on AE/DME for safe and independent return home.  Treatment Diagnosis: Weakness  Prognosis: Good  Decision Making: Medium Complexity  REQUIRES OT FOLLOW-UP: Yes        Plan   Occupational Therapy Plan  Times Per Day: Once a day  Days Per Week: 7 Days  Current Treatment Recommendations: Strengthening, Balance training, Functional mobility training, Endurance training, Pain management, Safety education & training, Patient/Caregiver education & training, Self-Care / ADL, Home management training     Restrictions  Restrictions/Precautions  Restrictions/Precautions: General Precautions, Fall Risk, Weight Bearing  Lower Extremity Weight Bearing Restrictions  Right Lower Extremity Weight Bearing: Weight Bearing As Tolerated    Subjective   Subjective  Subjective: Patient denies pain, agreeable to OT evaluation.     Social/Functional History  Social/Functional History  Lives

## 2024-05-02 NOTE — FLOWSHEET NOTE
Discharge Criteria    Inpatients must meet Criteria 1 through 7. All other patients are either YES or N/A. If a NO is chosen then Anesthesia or Surgeon must be notified.      1.  Minimum 30 minutes after last dose of sedative medication.    Yes      2.  Systolic BP between 90 - 160. Diastolic BP between 60 - 90.    Yes      3.  Pulse between 60 - 120    Yes      4.  Respirations between 8 - 25.    Yes      5.  SpO2 92% - 100%.    Yes      6.  Able to cough and swallow or return to baseline function.    Yes      7.  Alert and oriented or return to baseline mental status.    Yes      8.  Demonstrates controlled, coordinated movements, ambulates with steady gait, or return to baseline activity function.    N/A      9.  Minimal or no pain or nausea, or at a level tolerable and acceptable to patient.    Yes      10. Takes and retains oral fluids as allowed.    N/A      11. Procedural / perioperative site stable.  Minimal or no bleeding.    Yes          12. If GI endoscopy procedure, minimal or no abdominal distention or passing flatus.    N/A      13. Written discharge instructions and emergency telephone number provided.    N/A      14. Accompanied by a responsible adult.    N/A

## 2024-05-02 NOTE — OP NOTE
[address] [address]                            OPERATIVE REPORT      PATIENT NAME: BEATRICE ORTIZ             : 1955  MED REC NO: [id number]                          ROOM: 0311  ACCOUNT NO: [id number]                       ADMIT DATE: 2024  PROVIDER: Elder Montelongo MD      DATE OF PROCEDURE:  2024    SURGEON:  Elder Montelongo MD    PREOPERATIVE DIAGNOSIS:  Degenerative arthritis, right knee.    POSTOPERATIVE DIAGNOSIS:  Degenerative arthritis, right knee.    PROCEDURE:  Right total knee replacement.    ANESTHESIA:  Spinal.    PROSTHESIS:  Paty Persona knee size 7 standard cemented femoral component, size E tibia plateau with a stem extension, 16 mm MC poly, and a 32 mm patellar dome.    DESCRIPTION OF PROCEDURE:  The patient was brought into the operating room, placed in supine position on the operating table.  Spinal anesthetic was then administered.  The patient has a BMI of 47.  Right lower extremity was prepped with ChloraPrep, draped in a sterile fashion.  The patient has range of motion from 15 degrees to 75 degrees of flexion.  Mild varus deformities noted.  A 10 cm medial parapatellar quad sparing incision was made.  This was taken down through the skin and subcu tissue.  Arthrotomy was made along the medial border of the patellar tendon through the medial retinaculum.  Medial quad snip was made at the superior pole of the patella.  Fat pad was then excised.  The undersurface of the patella was then examined and marked degenerative changes noted in all 3 compartments.  Central drill hole was placed down the femoral canal and distal femoral cut was set at 5 degrees of valgus.  A femoral tower was used and drill holes made at 3 degrees external rotation.  A size 7, 4-in-1 cutting block was then used on the surface of the tibia, was then osteotomized with the tibial jig.  Remnants of menisci removed.

## 2024-05-02 NOTE — PROGRESS NOTES
Spiritual Services Interventions  [facility] OR POOL/NONE   5/2/2024  Rocky Lou  68 y.o. year old female    Encounter Summary  Encounter Overview/Reason: (P) Initial Encounter  Service Provided For: (P) Patient  Referral/Consult From: (P) Rounding  Last Encounter : (P) 05/02/24  Complexity of Encounter: (P) Moderate  Begin Time: (P) 0900  End Time : (P) 0905  Total Time Calculated: (P) 5 min     Spiritual/Emotional needs  Type: (P) Spiritual Support                    Assessment/Intervention/Outcome  Intervention: (P) Prayer (assurance of)/[place]

## 2024-05-02 NOTE — PROGRESS NOTES
Writer at bedside for shift assessment and vitals- see flow sheet for details. Patient is alert and oriented x4, calm and cooperative with assessment. Currently denies pain and discomfort. RLE warm to touch, capillary refill less than 3 seconds- patient states full sensation. Dressing remains clean, dry and intact- drain compressed at this time. Writer assisted patient to restroom- +1 with walker then returned to chair, tolerating ambulation well; x1 void noted at this time. No additional request from writer, call light placed within reach, bed in lowest position and alarm engaged to promote patient safety. Writer encourages use of call light for assistance. Plan of care on going.

## 2024-05-02 NOTE — ANESTHESIA PROCEDURE NOTES
Spinal Block    Patient location during procedure: OR  Reason for block: primary anesthetic and at surgeon's request  Staffing  Performed: resident/CRNA   Performed by: Mitzy Irwin APRN - CRNA  Authorized by: Mitzy Irwin APRN - CRNA    Spinal Block  Patient position: sitting  Prep: Betadine  Patient monitoring: frequent blood pressure checks and continuous pulse ox  Approach: midline  Location: L3/L4  Provider prep: sterile gloves and mask  Local infiltration: lidocaine  Needle  Needle type: Pencan   Needle gauge: 24 G  Needle length: 4 in  Assessment  Sensory level: T6  Swirl obtained: Yes  CSF: clear  Attempts: 1  Hemodynamics: stable  Additional Notes  Per SRNA  Preanesthetic Checklist  Completed: patient identified, IV checked, site marked, risks and benefits discussed, surgical/procedural consents, equipment checked, pre-op evaluation, timeout performed, anesthesia consent given, oxygen available and monitors applied/VS acknowledged

## 2024-05-02 NOTE — CARE COORDINATION
Case Management Assessment  Initial Evaluation    Date/Time of Evaluation: 5/2/2024 2:17 PM  Assessment Completed by: CHAYA Wilde    If patient is discharged prior to next notation, then this note serves as note for discharge by case management.    Patient Name: Litzy Lou                   YOB: 1955  Diagnosis: Primary localized osteoarthritis of right knee [M17.11]  Status post total knee replacement using cement, right [Z96.651]                   Date / Time: 5/2/2024  6:06 AM    Patient Admission Status: Observation   Readmission Risk (Low < 19, Mod (19-27), High > 27): No data recorded  Current PCP: Mayra Cedillo APRN - NP  PCP verified by CM? Yes    Chart Reviewed: Yes      History Provided by: Patient, Significant Other  Patient Orientation: Alert and Oriented, Person, Place, Situation, Self    Patient Cognition: Alert    Hospitalization in the last 30 days (Readmission):  No    If yes, Readmission Assessment in  Navigator will be completed.    Advance Directives:      Code Status: Full Code   Patient's Primary Decision Maker is: Legal Next of Kin    Primary Decision Maker: Prince Lou - Spouse - [phone]    Discharge Planning:    Patient lives with: Spouse/Significant Other Type of Home: House  Primary Care Giver: Self  Patient Support Systems include: Spouse/Significant Other, Family Members   Current Financial resources: Medicare  Current community resources: None  Current services prior to admission: None            Current DME:              Type of Home Care services:  PT    ADLS  Prior functional level: Independent in ADLs/IADLs  Current functional level: Independent in ADLs/IADLs    PT AM-PAC:   /24  OT AM-PAC:   /24    Family can provide assistance at DC: Yes  Would you like Case Management to discuss the discharge plan with any other family members/significant others, and if so, who? Yes  Plans to Return to Present Housing: Yes  Other Identified

## 2024-05-02 NOTE — ANESTHESIA POSTPROCEDURE EVALUATION
Department of Anesthesiology  Postprocedure Note    Patient: Litzy Lou  MRN: [id number]  YOB: 1955  Date of evaluation: 5/2/2024    Procedure Summary       Date: 05/02/24 Room / Location: [address]    Anesthesia Start: 0820 Anesthesia Stop: 1124    Procedure: KNEE TOTAL ARTHROPLASTY (Right: Knee) Diagnosis:       Primary localized osteoarthritis of right knee      (Primary localized osteoarthritis of right knee [M17.11])    Surgeons: Elder Montelongo MD Responsible Provider: Mitzy Irwin APRN - CRNA    Anesthesia Type: spinal ASA Status: 3            Anesthesia Type: No value filed.    Ivana Phase I: Ivana Score: 10    Ivana Phase II:      Anesthesia Post Evaluation    Patient location during evaluation: bedside  Patient participation: complete - patient participated  Level of consciousness: awake and alert  Pain score: 0  Airway patency: patent  Nausea & Vomiting: no nausea and no vomiting  Cardiovascular status: hemodynamically stable  Respiratory status: acceptable  Hydration status: stable  Multimodal analgesia pain management approach  Pain management: adequate    No notable events documented.

## 2024-05-02 NOTE — ANESTHESIA PRE PROCEDURE
Department of Anesthesiology  Preprocedure Note       Name:  Litzy Lou   Age:  68 y.o.  :  1955                                          MRN:  [id number]         Date:  2024      Surgeon: Surgeon(s):  Elder Montelongo MD    Procedure: Procedure(s):  COLONOSCOPY    Medications prior to admission:   Prior to Admission medications    Medication Sig Start Date End Date Taking? Authorizing Provider   levothyroxine (SYNTHROID) 175 MCG tablet Take 1 tablet by mouth Daily    Jose Roman MD   ibuprofen (ADVIL;MOTRIN) 200 MG tablet Take 1 tablet by mouth every 8 hours as needed for Pain    Jose Roman MD   VITAMIN D PO Take by mouth    Jose Roman MD   Calcium Carb-Cholecalciferol (CALCIUM 500 +D PO) Take by mouth    Jose Roman MD   pravastatin (PRAVACHOL) 40 MG tablet Take 1 tablet by mouth nightly    Jose Roman MD   Multiple Vitamins-Minerals (MULTIVITAMIN ADULT PO) Take by mouth daily    Jose Roman MD   chlorthalidone (HYGROTON) 25 MG tablet Take 1 tablet by mouth daily    Jose Roman MD   atenolol (TENORMIN) 50 MG tablet Take 2 tablets by mouth daily    Jose Roman MD   citalopram (CELEXA) 40 MG tablet Take 0.5 tablets by mouth daily    Jose Roman MD   omeprazole (PRILOSEC) 20 MG capsule Take 1 capsule by mouth Daily    Jose Roman MD   tiZANidine (ZANAFLEX) 4 MG tablet Take 1 tablet by mouth every 6 hours as needed    Jose Roman MD   metFORMIN (GLUCOPHAGE) 500 MG tablet Take 1 tablet by mouth 2 times daily (with meals)    ProviderJose MD       Current medications:    Current Facility-Administered Medications   Medication Dose Route Frequency Provider Last Rate Last Admin   • lactated ringers IV soln infusion   IntraVENous Continuous Mary Stewart APRN - CRNA       • ceFAZolin (ANCEF) 2000 mg in 0.9% sodium chloride 100 mL IVPB  2,000 mg IntraVENous Once Elder Montelongo MD

## 2024-05-02 NOTE — BRIEF OP NOTE
Brief Postoperative Note      Patient: Litzy Lou  YOB: 1955  MRN: [id number]    Date of Procedure: 5/2/2024    Pre-Op Diagnosis Codes:     * Primary localized osteoarthritis of right knee [M17.11]    Post-Op Diagnosis: Same       Procedure(s):  KNEE TOTAL ARTHROPLASTY    Surgeon(s):  Elder Montelongo MD    Assistant:  * No surgical staff found *    Anesthesia: Spinal    Estimated Blood Loss (mL): 700 cc    Complications: None    Specimens:   * No specimens in log *    Implants:  Implant Name Type Inv. Item Serial No.  Lot No. LRB No. Used Action   CEMENT BNE 40GM HI VISC RADPQ FOR REV SURG - [id number]  CEMENT BNE 40GM HI VISC RADPQ FOR REV SURG [id number] [employer]-  Right 2 Implanted   COMPONENT PAT [id number] THK8.5MM KNEE POLY KT CONVENTIONAL - [id number]  COMPONENT PAT [id number] THK8.5MM KNEE POLY KT CONVENTIONAL  [employer]- [id number] Right 1 Implanted   IMPL KNEE PSN FEM CR CMT CCR STD SZ7 R - [id number] Knee IMPL KNEE PSN FEM CR CMT CCR STD SZ7 R  [employer] [id number] Right 1 Implanted   PSN TIB STM 5 DEG SZ E R - [id number]  PSN TIB STM 5 DEG SZ E R  [facility] [id number] Right 1 Implanted   EXTENSION STEM L30MM [id number] KNEE TAPR KT PERSONA - [id number]  EXTENSION STEM L30MM [id number] KNEE TAPR KT PERSONA  [facility] [id number] Right 1 Implanted   EXTENSION STEM L30MM [id number] KNEE TAPR KT PERSONA - [id number]  EXTENSION STEM L30MM [id number] KNEE TAPR KT PERSONA  [employer]- [id number] Right 1 Implanted         Drains:   Closed/Suction Drain Right;Anterior;Lateral Knee Accordion (Active)       Findings:  Infection Present At Time Of Surgery (PATOS) (choose all levels that have infection present):  No infection present  Other Findings:     Electronically signed by ELDER MONTELONGO MD on 5/2/2024 at 11:20 AM

## 2024-05-02 NOTE — PROGRESS NOTES
Patient arrived to room at this time. Report received from surgery nurse at bedside. Vitals and assessment complete. See flowsheets for details. Vitals are WNL. Patient is resting in bed. Patient is A&O x4. Patient denies pain. Breathing is regular and unlabored. Dressing to RLE is clean, dry, and intact. Patient denies numbness and tingling. RLE is pink, cap refill is WNL, and right pedal pulse is +2. Patient is able to move BLE. Drain is in place and open to gravity drainage. Lunch ordered for the patient. Patient denies further needs at this time. Call light is within reach. Care ongoing.

## 2024-05-02 NOTE — PROGRESS NOTES
Physical Therapy  Facility/Department: [facility] MED SURG  Physical Therapy Initial Assessment    Name: Litzy Lou  : 1955  MRN: [id number]  Date of Service: 2024    Discharge Recommendations:  Continue to assess pending progress, Outpatient PT, Home with assist PRN   PT Equipment Recommendations  Equipment Needed: No      Patient Diagnosis(es): There were no encounter diagnoses.  Past Medical History:  has a past medical history of Acid reflux, Ankle edema, bilateral, Arthritis, Hyperlipidemia, MVP (mitral valve prolapse), and Thyroid disease.  Past Surgical History:  has a past surgical history that includes Arm Surgery (Right, ); pr colon ca scrn not hi rsk ind (N/A, 2017); Colonoscopy (2017); and Mole removal.    Assessment   Body Structures, Functions, Activity Limitations Requiring Skilled Therapeutic Intervention: Decreased functional mobility ;Decreased strength;Decreased high-level IADLs;Decreased balance;Decreased endurance  Assessment: Pt is a 68 year old female being evaluated for skilled acute care physical therapy status post right TKA. Pain appearing well managed. Pt seated in chair upon therapist arrival. Pt able to complete sit to and from stand transfers at contact guard assist. Pt ambulated in room with front wheeled walker at stand by assist. Therapist provided verbal cuing for gait pattern sequencing with pt able to complete on own following cues. Pt to benefit from skilled acute care physical therapy in order to facilitate safety as needed for return to prior level of function and living.  Treatment Diagnosis: right knee pain, difficulty with ambulation  Specific Instructions for Next Treatment: 1x daily on weekends  Therapy Prognosis: Good  Decision Making: Low Complexity  Requires PT Follow-Up: Yes  Activity Tolerance  Activity Tolerance: Patient tolerated evaluation without incident     Plan   Physical Therapy Plan  General Plan: 2 times a day 7 days a

## 2024-05-02 NOTE — ANESTHESIA PROCEDURE NOTES
Peripheral Block    Patient location during procedure: pre-op  Reason for block: post-op pain management and at surgeon's request  Start time: 5/2/2024 7:40 AM  End time: 5/2/2024 7:45 AM  Staffing  Performed: resident/CRNA   Resident/CRNA: Patrice Hernandez APRN - CRNA  Performed by: Patrice Hernandez APRN - CRNA  Authorized by: Mitzy Irwin APRN - CRNA    Preanesthetic Checklist  Completed: patient identified, IV checked, site marked, risks and benefits discussed, surgical/procedural consents, equipment checked, pre-op evaluation, timeout performed, anesthesia consent given, oxygen available and monitors applied/VS acknowledged  Peripheral Block   Patient position: supine  Prep: ChloraPrep  Provider prep: sterile gloves and mask  Patient monitoring: continuous pulse ox, cardiac monitor and IV access  Block type: Saphenous and iPacks  Laterality: right  Injection technique: single-shot  Guidance: ultrasound guided  Local infiltration: decadron and ropivacaine  Infiltration strength: 0.5 %  Local infiltration: decadron and ropivacaine  Dose: 20 mL    Needle   Needle type: Other   Needle gauge: 21 G  Needle localization: ultrasound guidance  Needle length: 8 cmOther needle type: [place]  Assessment   Injection assessment: negative aspiration for heme, no paresthesia on injection, local visualized surrounding nerve on ultrasound, no intravascular symptoms and low pressure verified by pressure monitor  Paresthesia pain: none  Slow fractionated injection: yes  Hemodynamics: stable  Outcomes: patient tolerated procedure well and uncomplicated    Additional Notes  0.5% Ropivacaine 20ml, PFNS 10ml, PF Decadron 10mg      Jeanette BAUTISTA placed these blocks under my supervision.

## 2024-05-03 VITALS
BODY MASS INDEX: 46.46 KG/M2 | HEART RATE: 78 BPM | OXYGEN SATURATION: 97 % | HEIGHT: 61 IN | TEMPERATURE: 97.1 F | RESPIRATION RATE: 18 BRPM | SYSTOLIC BLOOD PRESSURE: 134 MMHG | WEIGHT: 246.1 LBS | DIASTOLIC BLOOD PRESSURE: 78 MMHG

## 2024-05-03 LAB
HCT VFR BLD AUTO: 34.3 % (ref 36.3–47.1)
HGB BLD-MCNC: 11.5 G/DL (ref 11.9–15.1)

## 2024-05-03 PROCEDURE — 36415 COLL VENOUS BLD VENIPUNCTURE: CPT

## 2024-05-03 PROCEDURE — 2500000003 HC RX 250 WO HCPCS: Performed by: ORTHOPAEDIC SURGERY

## 2024-05-03 PROCEDURE — 85018 HEMOGLOBIN: CPT

## 2024-05-03 PROCEDURE — G0378 HOSPITAL OBSERVATION PER HR: HCPCS

## 2024-05-03 PROCEDURE — 97110 THERAPEUTIC EXERCISES: CPT

## 2024-05-03 PROCEDURE — 6370000000 HC RX 637 (ALT 250 FOR IP): Performed by: NURSE PRACTITIONER

## 2024-05-03 PROCEDURE — 85014 HEMATOCRIT: CPT

## 2024-05-03 PROCEDURE — 6370000000 HC RX 637 (ALT 250 FOR IP): Performed by: ORTHOPAEDIC SURGERY

## 2024-05-03 PROCEDURE — 97535 SELF CARE MNGMENT TRAINING: CPT

## 2024-05-03 PROCEDURE — 94761 N-INVAS EAR/PLS OXIMETRY MLT: CPT

## 2024-05-03 PROCEDURE — 96365 THER/PROPH/DIAG IV INF INIT: CPT

## 2024-05-03 PROCEDURE — 96372 THER/PROPH/DIAG INJ SC/IM: CPT

## 2024-05-03 PROCEDURE — 2580000003 HC RX 258: Performed by: ORTHOPAEDIC SURGERY

## 2024-05-03 PROCEDURE — 97116 GAIT TRAINING THERAPY: CPT

## 2024-05-03 PROCEDURE — 6360000002 HC RX W HCPCS: Performed by: ORTHOPAEDIC SURGERY

## 2024-05-03 RX ORDER — HYDROCODONE BITARTRATE AND ACETAMINOPHEN 5; 325 MG/1; MG/1
1-2 TABLET ORAL EVERY 6 HOURS PRN
Qty: 40 TABLET | Refills: 0 | Status: SHIPPED | OUTPATIENT
Start: 2024-05-03 | End: 2024-05-10

## 2024-05-03 RX ORDER — RIVAROXABAN 10 MG/1
10 TABLET, FILM COATED ORAL
Qty: 12 TABLET | Refills: 0 | Status: SHIPPED | OUTPATIENT
Start: 2024-05-03

## 2024-05-03 RX ADMIN — PANTOPRAZOLE SODIUM 40 MG: 40 TABLET, DELAYED RELEASE ORAL at 08:45

## 2024-05-03 RX ADMIN — CITALOPRAM 20 MG: 20 TABLET, FILM COATED ORAL at 08:45

## 2024-05-03 RX ADMIN — ATENOLOL 100 MG: 50 TABLET ORAL at 08:45

## 2024-05-03 RX ADMIN — MULTIVITAMIN TABLET 1 TABLET: TABLET at 08:47

## 2024-05-03 RX ADMIN — ENOXAPARIN SODIUM 30 MG: 100 INJECTION SUBCUTANEOUS at 08:45

## 2024-05-03 RX ADMIN — LEVOTHYROXINE SODIUM 137 MCG: 25 TABLET ORAL at 08:45

## 2024-05-03 RX ADMIN — CALCIUM CARBONATE-VITAMIN D TAB 500 MG-200 UNIT 1 TABLET: 500-200 TAB at 08:47

## 2024-05-03 RX ADMIN — SODIUM CHLORIDE, PRESERVATIVE FREE 10 ML: 5 INJECTION INTRAVENOUS at 08:45

## 2024-05-03 RX ADMIN — METFORMIN HYDROCHLORIDE 500 MG: 500 TABLET, EXTENDED RELEASE ORAL at 08:45

## 2024-05-03 RX ADMIN — HYDROCODONE BITARTRATE AND ACETAMINOPHEN 2 TABLET: 5; 325 TABLET ORAL at 06:37

## 2024-05-03 RX ADMIN — TRANEXAMIC ACID 1000 MG: 100 INJECTION, SOLUTION INTRAVENOUS at 08:54

## 2024-05-03 RX ADMIN — CHLORTHALIDONE 25 MG: 25 TABLET ORAL at 08:45

## 2024-05-03 ASSESSMENT — PAIN - FUNCTIONAL ASSESSMENT: PAIN_FUNCTIONAL_ASSESSMENT: ACTIVITIES ARE NOT PREVENTED

## 2024-05-03 ASSESSMENT — PAIN DESCRIPTION - LOCATION: LOCATION: KNEE

## 2024-05-03 ASSESSMENT — PAIN SCALES - GENERAL: PAINLEVEL_OUTOF10: 8

## 2024-05-03 ASSESSMENT — PAIN DESCRIPTION - ORIENTATION: ORIENTATION: RIGHT

## 2024-05-03 ASSESSMENT — PAIN DESCRIPTION - PAIN TYPE: TYPE: SURGICAL PAIN

## 2024-05-03 ASSESSMENT — PAIN DESCRIPTION - DESCRIPTORS: DESCRIPTORS: SHARP

## 2024-05-03 NOTE — CONSULTS
04/24/2024    CL 98 04/24/2024    CO2 29 04/24/2024       PROBLEM LIST:  Principal Problem:    Status post total knee replacement using cement, right  Active Problems:    Thyroid disease    Acid reflux    Essential hypertension  Resolved Problems:    * No resolved hospital problems. *      ASSESSMENT / PLAN:    Post op medical management for HTN  Post op day # 1 s/p right TKR  Continue current physical and occupational therapy  Continue DVT prophylaxis  Treatment plan:   PT/OT  Ice to right knee  Up with assistance  Imaging: no further imaging studies ordered today  Medications:   Continue Lovenox  Continue Norco  Medication Monitoring / High Risk Medications: none       Acute post op blood loss anemia  Monitor H/H-11.5    Hypertension  Condition is a chronic stable condition  Treatment plan: Continue current treatment  Imaging: no further imaging studies ordered today  Medications:   Continue atenolol, chlorthalidone     Hypothyroidism  Condition is a chronic stable condition  Treatment plan: Continue current treatment  Imaging: no further imaging studies ordered today  Medications:   Continue Synthroid    Nutrition status:   Well developed, well nourished with no malnutrition  Dietician consult initiated     Hospital Prophylaxis:   DVT: Lovenox   Stress Ulcer: PPI     Discharge plan is home with outpatient PT    Rosa Roe, RODRIGUEZ - CNP , APRN-CNP  5/3/2024  8:21 AM    
tingling.    Exam:  GEN:    Awake, alert and oriented x 3. no acute distress  NECK:  Supple. normal  CVS:    RRR, no murmur, rub or gallop  PULM:  CTA, no wheezes, rales or rhonchi  ABD:    Bowels sounds normal.  Abdomen is soft.  No distention.  No tenderness.   EXT:   no  edema.  Pedal pulses are intact. No calf tenderness  NEURO: Motor and sensory are intact  SKIN:  Incision is clean, dry and intact.  No surrounding erythema.  -----------------------------------------------------------------  Diagnostic Data:    Lab Results   Component Value Date    WBC 8.4 04/24/2024    HGB 14.3 04/24/2024    MCV 87.9 04/24/2024     04/24/2024      Lab Results   Component Value Date    GLUCOSE 109 (H) 04/24/2024    BUN 16 04/24/2024    CREATININE 0.5 04/24/2024     04/24/2024    K 3.7 04/24/2024    CALCIUM 9.1 04/24/2024    CL 98 04/24/2024    CO2 29 04/24/2024         ASSESSMENT:      Post-op medical management of:        Principal Problem:    Status post total knee replacement using cement, right  Active Problems:    Thyroid disease    Acid reflux    Essential hypertension  Resolved Problems:    * No resolved hospital problems. *    s/p left total Knee arthroplasty    PLAN:    Primary Problem(s): Status post total knee replacement using cement, right  Differential diagnoses: Osteoarthritis  Condition is an undiagnosed new problem with uncertain prognosis  Condition is stable  Treatment plan:   PT/OT  Monitor H/H  Ice to right knee  Up with assistance  Imaging: no further imaging studies ordered today  Medications:   IV fluids  Continue Lovenox  Continue Norco  Medication Monitoring / High Risk Medications: none      Hypertension  Condition is a chronic stable condition  Treatment plan: Continue current treatment  Imaging: no further imaging studies ordered today  Medications:   Continue atenolol, chlorthalidone      Hypothyroidism  Condition is a chronic stable condition  Treatment plan: Continue current

## 2024-05-03 NOTE — PROGRESS NOTES
Vitals and assessment done at this time. See flow sheet for more details. Pt resting in chair at this time. PT stated she is starting to have muscle spasms that are causing some pain. Pt given Norco at this time. Pts pedal pulse moderate, neuro checks completed in flow sheet. Drain emptied and compressed at this time. Pt denied any shortness of breath, dizziness, and numbness or tingling in hands or feet. Pt denied any further needs at this time. Call light within reach, will continue to monitor.

## 2024-05-03 NOTE — PROGRESS NOTES
Reviewed discharge instructions with patient and spouse.  Patient aware of need to  Xarelto.  Norco paper prescription given to patient.  Reviewed new medications and side effects to monitor for.   Patient aware of date/time of follow up appointments.  Instructed to follow a diabetic diet, ambulate with use of walker.  Patient aware that she may shower on Monday with use of CHG soap as long as waterproof dressing remains intact.  Educated on signs/symptoms of infection to monitor for and instructed to contact Dr. Dobbs's in case of need.  Phone number given to patient.  Educational handout given on Preventing Surgical Site infection and Post-Op Total Knee Replacement.  Questions answered.  Verbalizes understanding.  Copy of discharge instructions given to patient.

## 2024-05-03 NOTE — DISCHARGE INSTR - DIET

## 2024-05-03 NOTE — PROGRESS NOTES
Occupational Therapy  Facility/Department: [facility] MED SURG  Daily Treatment Note  NAME: Litzy Lou  : 1955  MRN: [id number]    Date of Service: 5/3/2024    Discharge Recommendations:  Outpatient OT         Patient Diagnosis(es): The encounter diagnosis was Status post total knee replacement using cement, right.     Assessment    Activity Tolerance: Patient tolerated treatment well  Discharge Recommendations: Outpatient OT      Plan   Occupational Therapy Plan  Times Per Day: Once a day  Days Per Week: 7 Days  Current Treatment Recommendations: Strengthening;Balance training;Functional mobility training;Endurance training;Pain management;Safety education & training;Patient/Caregiver education & training;Self-Care / ADL;Home management training       Subjective   Subjective  Subjective: pt reports doing very well this date and states she is prepared to go home today.  Pain: pt reports no pain at this time.  Orientation  Overall Orientation Status: Within Functional Limits  Pain: Pt states pain was 8-9/10 this morning but she took pain meds and \"it is better\", no rating given at this time  Cognition  Overall Cognitive Status: WFL        Objective              ADL  Equipment Provided: Reacher;Sock aid;Long-handled shoe horn  Feeding: Independent  Grooming: Stand by assistance  LE Dressing: Minimal assistance    LE Dressing Skilled Clinical Factors: Pt reports her  plans to assist her with LB dressing and EDITA hose mgt. Writer educ pt on all LHAE and modifications/adaptations to ADLs routine to improve safety and indep. As well as knee precautions througout. Pt already has a shower chair and reacher.    Toileting: Stand by assistance        Safety Devices  Type of Devices: All fall risk precautions in place;Call light within reach;Chair alarm in place;Nurse notified;Left in chair     Patient Education  Education Given To: Patient  Education Provided: Role of Therapy;Plan of Care;Energy

## 2024-05-03 NOTE — PROGRESS NOTES
Physical Therapy  Facility/Department: [facility] MED SURG  Daily Treatment Note  NAME: Litzy Lou  : 1955  MRN: [id number]    Date of Service: 5/3/2024    Discharge Recommendations:  Continue to assess pending progress, Outpatient PT, Home with assist PRN      Patient Diagnosis(es): The encounter diagnosis was Status post total knee replacement using cement, right.    Assessment   Assessment: Seated B LE exercies x15. Transfers: CGA/SBA x1. Gait traininft using rw, CGA/SBA with gait belt. Step training x4 steps CGA, education for technique with step-to pattern. Pt educated on discharge folder, HEP and safety for use of stairs at home.  Activity Tolerance: Patient tolerated treatment well     Plan    Physical Therapy Plan  General Plan: 2 times a day 7 days a week  Specific Instructions for Next Treatment: 1x daily on weekends  Current Treatment Recommendations: Strengthening;ROM;Balance training;Functional mobility training;Gait training;Stair training;Transfer training;Neuromuscular re-education;Home exercise program;Positioning;Therapeutic activities;Patient/Caregiver education & training;Safety education & training;Endurance training     Restrictions  Restrictions/Precautions  Restrictions/Precautions: General Precautions, Fall Risk, Weight Bearing  Lower Extremity Weight Bearing Restrictions  Right Lower Extremity Weight Bearing: Weight Bearing As Tolerated     Subjective    Subjective  Subjective: Pt resting in chair on arrival, pleasant and cooperative with therapy. Pt states she did not get much sleep last night.  Pain: Pt states pain was 8-9/10 this morning but she took pain meds and \"it is better\", no rating given at this time  Orientation  Overall Orientation Status: Within Functional Limits  Cognition  Overall Cognitive Status: WFL     Objective   Transfer Training  Transfer Training: Yes  Overall Level of Assistance: Stand-by assistance;Assist X1;Contact-guard assistance  Interventions:

## 2024-05-03 NOTE — PROGRESS NOTES
[facility]                       Inpatient Medication Education Note                                 Patient admitted for Right total knee replacement    Medications reviewed with the patient include:  Xarelto    Patient education provided when necessary to include potential medication related side effects with acknowledgement of understanding.      Medication information packet provided to the patient.    Rachel Echols, PharmD, 5/3/2024 11:04 AM

## 2024-05-03 NOTE — PLAN OF CARE
Problem: Discharge Planning  Goal: Discharge to home or other facility with appropriate resources  Outcome: Progressing  Flowsheets (Taken 5/3/2024 0439)  Discharge to home or other facility with appropriate resources:   Identify barriers to discharge with patient and caregiver   Refer to discharge planning if patient needs post-hospital services based on physician order or complex needs related to functional status, cognitive ability or social support system   Identify discharge learning needs (meds, wound care, etc)   Arrange for interpreters to assist at discharge as needed   Arrange for needed discharge resources and transportation as appropriate     Problem: Pain  Goal: Verbalizes/displays adequate comfort level or baseline comfort level  Outcome: Progressing  Flowsheets (Taken 5/3/2024 0439)  Verbalizes/displays adequate comfort level or baseline comfort level:   Encourage patient to monitor pain and request assistance   Administer analgesics based on type and severity of pain and evaluate response   Consider cultural and social influences on pain and pain management   Assess pain using appropriate pain scale   Implement non-pharmacological measures as appropriate and evaluate response   Notify Licensed Independent Practitioner if interventions unsuccessful or patient reports new pain     Problem: Safety - Adult  Goal: Free from fall injury  Outcome: Progressing  Flowsheets (Taken 5/3/2024 0439)  Free From Fall Injury:   Based on caregiver fall risk screen, instruct family/caregiver to ask for assistance with transferring infant if caregiver noted to have fall risk factors   Instruct family/caregiver on patient safety  Note: Call light in reach at all times, frequent checks, bed in lowest position, wheels of bed and chair locked, non skid footwear on, appropriate transfer techniques, personal items within reach, walkways free of obstructions, fall risk armband and sign displayed, Silva fall risk score per

## 2024-05-03 NOTE — PROGRESS NOTES
Progress Note    Subjective:     Post-Operative Day: 1 Status Post right Total Knee Arthroplasty  Systemic or Specific Complaints:No Complaints    Objective:     Patient Vitals for the past 24 hrs:   BP Temp Temp src Pulse Resp SpO2 Weight   05/03/24 0633 134/78 97.1 °F (36.2 °C) Temporal 78 18 97 % --   05/03/24 0545 -- -- -- -- -- -- 111.6 kg (246 lb 1.6 oz)   05/02/24 2334 -- -- -- -- 18 -- --   05/02/24 1845 121/61 98 °F (36.7 °C) Temporal 79 20 -- --   05/02/24 1316 122/62 97.2 °F (36.2 °C) Temporal 71 18 96 % --   05/02/24 1207 129/75 97.4 °F (36.3 °C) Temporal 63 16 96 % --   05/02/24 1152 -- 97.3 °F (36.3 °C) Temporal -- -- -- --   05/02/24 1145 (!) 141/82 -- -- 66 15 96 % --   05/02/24 1140 (!) 153/72 -- -- 65 17 95 % --   05/02/24 1135 134/67 -- -- 62 15 95 % --   05/02/24 1130 (!) 149/77 -- -- 62 17 96 % --   05/02/24 1125 (!) 146/74 97.1 °F (36.2 °C) Temporal 62 17 96 % --       General: alert, appears stated age, and cooperative   Wound: Wound clean and dry no evidence of infection.   Motion: Painless Range of Motion   DVT Exam: No evidence of DVT seen on physical exam.       Knee swollen but thigh soft to palpation. Moving foot and ankle. Good distal pulses.      Data Review  CBC:   Lab Results   Component Value Date/Time    WBC 8.4 04/24/2024 01:50 PM    RBC 5.12 04/24/2024 01:50 PM    HGB 11.5 05/03/2024 05:35 AM    HCT 34.3 05/03/2024 05:35 AM     04/24/2024 01:50 PM       Assessment:     Status Post right Total Knee Arthroplasty. Doing well postoperatively.     Plan:      1: Discharge today, Return to Clinic: 2 weeks :  2:  Continue Deep venous thrombosis prophylaxis  3:  Continue physical therapy  4:  Continue Pain Control

## 2024-05-03 NOTE — PROGRESS NOTES
Drain removed from right leg at this time. Dressing is placed. Nurse placed aquacell. Pt denied any further needs at this time.

## 2024-05-03 NOTE — PLAN OF CARE
Problem: Discharge Planning  Goal: Discharge to home or other facility with appropriate resources  5/3/2024 1100 by Navya Carmona RN  Outcome: Adequate for Discharge  Flowsheets (Taken 5/3/2024 0633 by Jos Dominguez RN)  Discharge to home or other facility with appropriate resources: Identify barriers to discharge with patient and caregiver  5/3/2024 0439 by Viral Smith RN  Outcome: Progressing  Flowsheets (Taken 5/3/2024 0439)  Discharge to home or other facility with appropriate resources:   Identify barriers to discharge with patient and caregiver   Refer to discharge planning if patient needs post-hospital services based on physician order or complex needs related to functional status, cognitive ability or social support system   Identify discharge learning needs (meds, wound care, etc)   Arrange for interpreters to assist at discharge as needed   Arrange for needed discharge resources and transportation as appropriate     Problem: Pain  Goal: Verbalizes/displays adequate comfort level or baseline comfort level  5/3/2024 1100 by Navya Carmona RN  Outcome: Adequate for Discharge  Flowsheets (Taken 5/3/2024 0633 by Jos Dominguez RN)  Verbalizes/displays adequate comfort level or baseline comfort level: Encourage patient to monitor pain and request assistance  5/3/2024 0439 by Viral Smith RN  Outcome: Progressing  Flowsheets (Taken 5/3/2024 0439)  Verbalizes/displays adequate comfort level or baseline comfort level:   Encourage patient to monitor pain and request assistance   Administer analgesics based on type and severity of pain and evaluate response   Consider cultural and social influences on pain and pain management   Assess pain using appropriate pain scale   Implement non-pharmacological measures as appropriate and evaluate response   Notify Licensed Independent Practitioner if interventions unsuccessful or patient reports new pain     Problem: Safety - Adult  Goal: Free from

## 2024-05-03 NOTE — DISCHARGE INSTRUCTIONS
Report the following signs or any questions regarding your physical condition to your surgeon immediately:  Dr. Montelongo:  [phone]                          Excessive swelling of, or around the wound area.                          Redness.                          Temperature of 100 degrees (F) or above.                          Excessive pain.     May use Tylenol for mild pain.  Do not exceed 4000 mg of Acetaminophen in a 24 hour period.    May shower on Monday with use of CHG soap if waterproof dressing is intact.  Do not remove dressing until follow up appointment with Dr. Montelongo on 5/6/2024 @ 10:30 AM    Continue to wear babatunde hose during the day.  Remove babatunde hose at night.  Hand wash and towel dry.  Hang to dry and place back on first thing in A.M.

## 2024-05-06 ENCOUNTER — HOSPITAL ENCOUNTER (OUTPATIENT)
Dept: PHYSICAL THERAPY | Age: 69
Setting detail: THERAPIES SERIES
Discharge: HOME | End: 2024-05-06
Payer: COMMERCIAL

## 2024-05-06 PROCEDURE — 97110 THERAPEUTIC EXERCISES: CPT

## 2024-05-06 PROCEDURE — G0283 ELEC STIM OTHER THAN WOUND: HCPCS

## 2024-05-06 PROCEDURE — 97161 PT EVAL LOW COMPLEX 20 MIN: CPT

## 2024-05-06 NOTE — PROGRESS NOTES
manual techniques/modalities prn to decrease pain and edema and improve mobility of R knee.    Long Term Goals  Time Frame for Long Term Goals : 6 weeks  Long Term Goal 1: Patient to be independent and compliant with HEP.  Long Term Goal 2: Patient to have improved R knee ROM 0-110* for improved functional mobility.  Long Term Goal 3: Patient to have improved R LE strength >/=4/5 grossly all major joints and planes for improved knee stability.  Long Term Goal 4: Patient to be able to walk community distances with no AD, no deviations and no increase in pain to improve functional mobility.      Minutes Tracking:  Time In: 1445  Time Out: 1529  Minutes: 44  Timed Code Treatment Minutes: 43 Minutes      Cori Vogt PT, DPT    5/6/2024  Electronically signed by Cori Vogt PT on 5/6/2024 at 5:07 PM

## 2024-05-06 NOTE — PLAN OF CARE
[facility]           Phone: [phone]             Outpatient Physical Therapy  Fax: [phone]                                           Date: 2024  Patient: Litzy Lou : 1955 CSN #: [id number]   Referring Physician: Elder Montelongo MD      [x] Plan of Care   [] Updated Plan of Care    Dates of Service to Include: 2024 to 24    Diagnosis:  s/p R TKA, Z96.651     Rehab (Treatment) Diagnosis:  R knee pain         Onset Date:  24    Attendance  Total # of Visits to Date: 1 No Show: 0 Canceled Appointment: 0    Assessment  Assessment: Patient is 68 year old female s/p R TKA who presents with increased pain 6/10 on average.Pt walks with std. walker with moderate antalgic gait and lack of B TKE. R knee ROM 14-72* that improved 8-82* after stretching. Decreased R LE strength 3 to 3+/5 grossly. Patient to benefit from physical therapy to decrease pain and edema and improve R knee ROM and strength to return to PLOF.      Goals  Short Term Goals  Time Frame for Short Term Goals: 3 weeks  Short Term Goal 1: Patient to initiate HEP for improved R knee ROM and strength.  Short Term Goal 2: Patient to have improved R knee ROM 5-90* for improved mobility.  Short Term Goal 3: Initiate manual techniques/modalities prn to decrease pain and edema and improve mobility of R knee.  Long Term Goals  Time Frame for Long Term Goals : 6 weeks  Long Term Goal 1: Patient to be independent and compliant with HEP.  Long Term Goal 2: Patient to have improved R knee ROM 0-110* for improved functional mobility.  Long Term Goal 3: Patient to have improved R LE strength >/=4/5 grossly all major joints and planes for improved knee stability.  Long Term Goal 4: Patient to be able to walk community distances with no AD, no deviations and no increase in pain to improve functional mobility.     Prognosis  Therapy

## 2024-05-09 ENCOUNTER — HOSPITAL ENCOUNTER (OUTPATIENT)
Dept: PHYSICAL THERAPY | Age: 69
Setting detail: THERAPIES SERIES
Discharge: HOME | End: 2024-05-09
Payer: COMMERCIAL

## 2024-05-09 PROCEDURE — G0283 ELEC STIM OTHER THAN WOUND: HCPCS

## 2024-05-09 PROCEDURE — 97110 THERAPEUTIC EXERCISES: CPT

## 2024-05-09 NOTE — PROGRESS NOTES
Phone: [phone]                 [facility]           Fax: [phone]                           Outpatient Physical Therapy                                                                            Daily Note    Patient: Litzy Lou : 1955  [facility] #: [id number]   Referring Physician: Elder Montelongo MD  Date: 2024       Treatment Diagnosis: R knee pain    Onset Date: 24  PT Insurance Information: [employer]  Total # of Visits Approved: 12 Per Physician Order  Total # of Visits to Date: 2  No Show: 0  Canceled Appointment: 0    24 Plan of Care/Recert Due    Pre-Treatment Pain:  3/10  Subjective: Patient reports 3/10 knee pain coming into therapy today.  She reports she has been doing well with her HEP.    Exercises:  Exercise 1: HEP: heel prop, heel slides, quad sets  Exercise 2: LAQ x10, seated hamstring curl with blue theraband x10  Exercise 3: SciFIT: level 1 x 6 minutes  Exercise 4: Sit to stand x5  Exercise 5: Blue theraband x10  Exercise 6: March x10 ea/heel/toe raises x10 ea  Exercise 7: SLR 2x6  Exercise 8: Heel slides with strap x10  Exercise 9: Cane ambulation x75'    Manual:  Joint Mobilization: PROM R knee within patient tolerance    Modality:     Modality Flow Sheet:   Performed (X) Tx Modality   X Electrical Stim: IFC with CP x15 minutes to decrease pain/soreness     X Cold Pack: with IFC to decrease pain         Assessment  Assessment: Patient progressed with LE strength and stability exercises to work toward her long term goals.  Patient able to achieve 90* of flexion in sitting, but measures 81* in supine today.  Knee extension measured -4* in supine.  She was educated on cane ambulation today.    Activity Tolerance  Activity Tolerance: Patient tolerated treatment well    Patient Education  Patient Education: Continue with her HEP.  Pt verbalized/demonstrated good understanding:     [x] Yes         [] No, pt required further clarification.

## 2024-05-10 ENCOUNTER — HOSPITAL ENCOUNTER (OUTPATIENT)
Dept: PHYSICAL THERAPY | Age: 69
Setting detail: THERAPIES SERIES
Discharge: HOME | End: 2024-05-10
Payer: COMMERCIAL

## 2024-05-10 PROCEDURE — G0283 ELEC STIM OTHER THAN WOUND: HCPCS

## 2024-05-10 PROCEDURE — 97110 THERAPEUTIC EXERCISES: CPT

## 2024-05-10 NOTE — PROGRESS NOTES
verbalized/demonstrated good understanding:     [x] Yes         [] No, pt required further clarification.       Post Treatment Pain:  3/10      Plan  Plan Frequency: 3  Plan weeks: 4       Goals  (Total # of Visits to Date: 3)      Short Term Goals  Time Frame for Short Term Goals: 3 weeks  Short Term Goal 1: Patient to initiate HEP for improved R knee ROM and strength.  Short Term Goal 2: Patient to have improved R knee ROM 5-90* for improved mobility.  Short Term Goal 3: Initiate manual techniques/modalities prn to decrease pain and edema and improve mobility of R knee.    Long Term Goals  Time Frame for Long Term Goals : 6 weeks  Long Term Goal 1: Patient to be independent and compliant with HEP.  Long Term Goal 2: Patient to have improved R knee ROM 0-110* for improved functional mobility.  Long Term Goal 3: Patient to have improved R LE strength >/=4/5 grossly all major joints and planes for improved knee stability.  Long Term Goal 4: Patient to be able to walk community distances with no AD, no deviations and no increase in pain to improve functional mobility.    Minutes Tracking:  Time In: 1230  Time Out: 1335  Minutes: 65  Timed Code Treatment Minutes: 64 Minutes        Edie Hinkle PTA     Date: 5/10/2024

## 2024-05-13 ENCOUNTER — HOSPITAL ENCOUNTER (OUTPATIENT)
Dept: PHYSICAL THERAPY | Age: 69
Setting detail: THERAPIES SERIES
Discharge: HOME | End: 2024-05-13
Payer: COMMERCIAL

## 2024-05-13 PROCEDURE — 97140 MANUAL THERAPY 1/> REGIONS: CPT

## 2024-05-13 PROCEDURE — 97110 THERAPEUTIC EXERCISES: CPT

## 2024-05-13 PROCEDURE — G0283 ELEC STIM OTHER THAN WOUND: HCPCS

## 2024-05-14 ENCOUNTER — APPOINTMENT (OUTPATIENT)
Dept: PHYSICAL THERAPY | Age: 69
End: 2024-05-14
Payer: COMMERCIAL

## 2024-05-15 ENCOUNTER — APPOINTMENT (OUTPATIENT)
Dept: PHYSICAL THERAPY | Age: 69
End: 2024-05-15
Payer: COMMERCIAL

## 2024-05-16 ENCOUNTER — HOSPITAL ENCOUNTER (OUTPATIENT)
Dept: PHYSICAL THERAPY | Age: 69
Setting detail: THERAPIES SERIES
Discharge: HOME | End: 2024-05-16
Payer: COMMERCIAL

## 2024-05-16 PROCEDURE — 97110 THERAPEUTIC EXERCISES: CPT

## 2024-05-16 PROCEDURE — G0283 ELEC STIM OTHER THAN WOUND: HCPCS

## 2024-05-16 NOTE — PROGRESS NOTES
Phone: [phone]                 [facility]           Fax: [phone]                           Outpatient Physical Therapy                                                                            Daily Note    Patient: Litzy Lou : 1955  [facility] #: [id number]   Referring Physician: Elder Montelongo MD  Date: 2024       Treatment Diagnosis: R knee pain    Onset Date: 24  PT Insurance Information: [employer]  Total # of Visits Approved: 12 Per Physician Order  Total # of Visits to Date: 5  No Show: 0  Canceled Appointment: 0    24 Plan of Care/Recert Due    Pre-Treatment Pain:  2/10  Subjective: Patient reports dull ache in R knee this date, min edema in R ankle.    Exercises:  Exercise 1: HEP: heel prop, heel slides, quad sets  Exercise 2: LAQ x10, seated hamstring curl with blue theraband x10  Exercise 3: SciFIT: level 3 x 7 minutes  Exercise 4: Sit to stand 2x10  Exercise 5: Blue theraband x10  Exercise 6: March x10 ea/heel/toe raises x10 ea  Exercise 7: SLR 2x6  Exercise 8: Heel slides with strap x10  Exercise 11: SB stretch, HS stretch, flexion stretch 2 x 20\"  Exercise 12: knee flexion gliding disc 10 x 10\"  Exercise 13: FSU 15x    Manual:  Joint Mobilization: PROM R knee within patient tolerance    Modality: IFC/CP to R knee to reduce edema and discomfort.     Assessment  Assessment: Continued to educated patient to reduce circumduction with R LE with fair carryover. Pt PROM R knee -2* extension 95* flexion. IFC/CP to reduce edema and discomfort. Continue per tolerance.    Activity Tolerance  Activity Tolerance: Patient tolerated treatment well    Patient Education  Patient Education: Continue with her HEP.  Pt verbalized/demonstrated good understanding:     [x] Yes         [] No, pt required further clarification.       Post Treatment Pain:  2/10      Plan  Plan Frequency: 3  Plan weeks: 4       Goals  (Total # of Visits to Date: 5)      Short Term

## 2024-05-20 ENCOUNTER — HOSPITAL ENCOUNTER (OUTPATIENT)
Dept: PHYSICAL THERAPY | Age: 69
Setting detail: THERAPIES SERIES
Discharge: HOME | End: 2024-05-20
Payer: COMMERCIAL

## 2024-05-20 PROCEDURE — G0283 ELEC STIM OTHER THAN WOUND: HCPCS

## 2024-05-20 PROCEDURE — 97110 THERAPEUTIC EXERCISES: CPT

## 2024-05-20 NOTE — PROGRESS NOTES
6)      Short Term Goals  Time Frame for Short Term Goals: 3 weeks  Short Term Goal 1: Patient to initiate HEP for improved R knee ROM and strength.-MET  Short Term Goal 2: Patient to have improved R knee ROM 5-90* for improved mobility.-progressing  Short Term Goal 3: Initiate manual techniques/modalities prn to decrease pain and edema and improve mobility of R knee.    Long Term Goals  Time Frame for Long Term Goals : 6 weeks  Long Term Goal 1: Patient to be independent and compliant with HEP.  Long Term Goal 2: Patient to have improved R knee ROM 0-110* for improved functional mobility.  Long Term Goal 3: Patient to have improved R LE strength >/=4/5 grossly all major joints and planes for improved knee stability.  Long Term Goal 4: Patient to be able to walk community distances with no AD, no deviations and no increase in pain to improve functional mobility.    Minutes Tracking:  Time In: 1300  Time Out: 1400  Minutes: 60  Timed Code Treatment Minutes: 58 Minutes      Edie Hinkle, RAPHAEL     Date: 5/20/2024

## 2024-05-23 ENCOUNTER — HOSPITAL ENCOUNTER (OUTPATIENT)
Dept: PHYSICAL THERAPY | Age: 69
Setting detail: THERAPIES SERIES
Discharge: HOME | End: 2024-05-23
Payer: COMMERCIAL

## 2024-05-23 PROCEDURE — G0283 ELEC STIM OTHER THAN WOUND: HCPCS

## 2024-05-23 PROCEDURE — 97110 THERAPEUTIC EXERCISES: CPT

## 2024-05-24 ENCOUNTER — HOSPITAL ENCOUNTER (OUTPATIENT)
Dept: PHYSICAL THERAPY | Age: 69
Setting detail: THERAPIES SERIES
Discharge: HOME | End: 2024-05-24
Payer: COMMERCIAL

## 2024-05-24 PROCEDURE — 97110 THERAPEUTIC EXERCISES: CPT

## 2024-05-24 PROCEDURE — G0283 ELEC STIM OTHER THAN WOUND: HCPCS

## 2024-05-24 NOTE — PROGRESS NOTES
Treatment Pain:  1/10      Plan  Plan Frequency: 3  Plan weeks: 4       Goals  (Total # of Visits to Date: 7)      Short Term Goals  Time Frame for Short Term Goals: 3 weeks  Short Term Goal 1: Patient to initiate HEP for improved R knee ROM and strength.-MET  Short Term Goal 2: Patient to have improved R knee ROM 5-90* for improved mobility.-MET  Short Term Goal 3: Initiate manual techniques/modalities prn to decrease pain and edema and improve mobility of R knee. -met    Long Term Goals  Time Frame for Long Term Goals : 6 weeks  Long Term Goal 1: Patient to be independent and compliant with HEP. - MET  Long Term Goal 2: Patient to have improved R knee ROM 0-110* for improved functional mobility.  Long Term Goal 3: Patient to have improved R LE strength >/=4/5 grossly all major joints and planes for improved knee stability.  Long Term Goal 4: Patient to be able to walk community distances with no AD, no deviations and no increase in pain to improve functional mobility.    Minutes Tracking:  Time In: 1400  Time Out: 1503  Minutes: 63  Timed Code Treatment Minutes: 60 Minutes        Edie Hinkle, RAPHAEL     Date: 5/23/2024

## 2024-05-24 NOTE — PROGRESS NOTES
Phone: [phone]                 [facility]           Fax: [phone]                           Outpatient Physical Therapy                                                                            Daily Note    Patient: Litzy Lou : 1955  [facility] #: [id number]   Referring Physician: Elder Montelongo MD  Date: 2024       Treatment Diagnosis: R knee pain    Onset Date: 24  PT Insurance Information: [employer]  Total # of Visits Approved: 12 Per Physician Order  Total # of Visits to Date: 8  No Show: 0  Canceled Appointment: 0    24 Plan of Care/Recert Due    Pre-Treatment Pain:  2/10  Subjective: Patient reports 2/10 pain in R knee prior to therapy session.    Exercises:  Exercise 1: HEP: heel prop, heel slides, quad sets  Exercise 3: SciFIT: level 3 x 7 minutes - hills  Exercise 4: Sit to stand 2x10  Exercise 6: March x10 ea/heel/toe raises x10 ea on foam  Exercise 7: SLR 2x6  Exercise 11: SB stretch, HS stretch, flexion stretch 2 x 20\"  Exercise 13: FSU, LSU 6\" 15x ea, SD 6\" 10x  Exercise 14: bryan step overs 4\" x15    Modalities: IFC/CP applied to R knee to reduce edema and discomfort. CP applied to R ankle to reduce edema with elevation. MHP applied to R hamstring to reduce muscle tone.    Assessment  Assessment: Progressed patient with step downs and bryan step overs, fair tolerance. Patient reports pain in L knee with step downs due to \"unable to bend well and needs replaced\". Cues to reduce compensation and hip circumduction with hurdles, good carryover. IFC/CP applied to R knee to reduce edema and discomfort. CP applied to R ankle to reduce edema with elevation due to min edema present. MHP applied to R hamstring to reduce muscle tone. Continue per tolerance.    Activity Tolerance  Activity Tolerance: Patient tolerated treatment well    Patient Education  Patient Education: Continue with her HEP.  Pt verbalized/demonstrated good understanding:     [x]

## 2024-05-30 ENCOUNTER — HOSPITAL ENCOUNTER (OUTPATIENT)
Dept: PHYSICAL THERAPY | Age: 69
Setting detail: THERAPIES SERIES
Discharge: HOME | End: 2024-05-30
Payer: COMMERCIAL

## 2024-05-30 PROCEDURE — 97110 THERAPEUTIC EXERCISES: CPT

## 2024-05-30 PROCEDURE — G0283 ELEC STIM OTHER THAN WOUND: HCPCS

## 2024-05-30 NOTE — PROGRESS NOTES
Phone: [phone]                 [facility]           Fax: [phone]                           Outpatient Physical Therapy                                                                            Daily Note    Patient: Litzy Lou : 1955  [facility] #: [id number]   Referring Physician: Elder Montelongo MD  Date: 2024       Treatment Diagnosis: R knee pain    Onset Date: 24  PT Insurance Information: [employer]  Total # of Visits Approved: 12 Per Physician Order  Total # of Visits to Date: 9  No Show: 0  Canceled Appointment: 0    24 Plan of Care/Recert Due    Pre-Treatment Pain:  1/10  Subjective: Patient reports \"1/10 dull pain sometimes\" in R knee, patient states L knee hurts more than R knee.    Exercises:  Exercise 1: HEP: heel prop, heel slides, quad sets  Exercise 2: LAQ x10, seated hamstring curl with blue theraband x10  Exercise 3: SciFIT: level 3 x 7 minutes - hills  Exercise 4: Sit to stand 2x10  Exercise 6: March x10 ea/heel/toe raises x10 ea on foam, mini squats on foam x15  Exercise 8: Heel slides with strap 10 x 10\"  Exercise 10: quad set with roll under ankle x10  Exercise 11: SB stretch, HS stretch, flexion stretch 2 x 20\"  Exercise 13: FSU, LSU 6\" 15x ea, SD 6\" 10x  Exercise 14: bryan step overs 4\" x15    Manual:  Joint Mobilization: PROM R knee within patient tolerance, patella mobs    Modality: IFC/CP applied to R knee to reduce edema and discomfort.     Assessment  Assessment: Trialed 6\" bryan this date with fair tolerance, patient demo's ankle inversion with increased height. Patient continues to report pain and discomfort in L knee limiting R knee progression. AROM R knee -2-102*. IFC/CP applied to R knee to reduce edema and discomfort.    Activity Tolerance  Activity Tolerance: Patient tolerated treatment well    Patient Education  Patient Education: Continue with her HEP.  Pt verbalized/demonstrated good understanding:     [x] Yes

## 2024-05-31 ENCOUNTER — HOSPITAL ENCOUNTER (OUTPATIENT)
Dept: PHYSICAL THERAPY | Age: 69
Setting detail: THERAPIES SERIES
Discharge: HOME | End: 2024-05-31
Payer: COMMERCIAL

## 2024-05-31 PROCEDURE — 97110 THERAPEUTIC EXERCISES: CPT

## 2024-05-31 PROCEDURE — G0283 ELEC STIM OTHER THAN WOUND: HCPCS

## 2024-05-31 NOTE — PROGRESS NOTES
Phone: [phone]                 [facility]           Fax: [phone]                           Outpatient Physical Therapy                                                                            Daily Note    Patient: Litzy Lou : 1955  [facility] #: [id number]   Referring Physician: Elder Montelongo MD  Date: 2024       Treatment Diagnosis: R knee pain    Onset Date: 24  PT Insurance Information: [employer]  Total # of Visits Approved: 12 Per Physician Order  Total # of Visits to Date: 10  No Show: 0  Canceled Appointment: 0    24 Plan of Care/Recert Due    Pre-Treatment Pain:  2/10  Subjective: patient reports minimal pain in R knee prior to therapy session. L knee continues to be bothersome    Exercises:  Exercise 1: HEP: heel prop, heel slides, quad sets  Exercise 2: LAQ 2# x30, seated hamstring curl with blue theraband x10  Exercise 3: SciFIT: level 3 x 10 minutes - hills  Exercise 4: Sit to stand 2x10  Exercise 6: March x20 ea/heel/toe raises x20 ea on foam, mini squats on foam x15  Exercise 7: SLR 2x10, bridges 1 x 10  Exercise 11: SB stretch, HS stretch, flexion stretch 2 x 20\"  Exercise 13: FSU, LSU 6\" 15x ea, SD 6\" 10x  Exercise 14: bryan step overs 4\" x20    Manual:  Joint Mobilization: PROM R knee within patient tolerance, patella mobs    Modality: IFC/CP applied to R knee to reduce DOMS and discomfort. CP applied to L knee to reduce discomfort.     Assessment  Assessment: Progressed patient with increased reps to improve strength and ROM in R knee with good tolerance. Patient displays R LE strength 4+/5 in all major joints and planes. IFC applied to R knee, CP applied to B knee to reduce pain and discomfort. Continue per tolerance.    Activity Tolerance  Activity Tolerance: Patient tolerated treatment well    Patient Education  Patient Education: Continue with her HEP.  Pt verbalized/demonstrated good understanding:     [x] Yes         [] No, pt

## 2024-06-03 ENCOUNTER — HOSPITAL ENCOUNTER (OUTPATIENT)
Dept: PHYSICAL THERAPY | Age: 69
Setting detail: THERAPIES SERIES
Discharge: HOME | End: 2024-06-03
Payer: COMMERCIAL

## 2024-06-03 NOTE — PROGRESS NOTES
[facility]  Inpatient/Observation/Outpatient Rehabilitation    Date: 6/3/2024  Patient Name: Litzy Lou       [] Inpatient Acute/Observation       [x]  Outpatient  : 1955 Plan of Care/Recert ends    [] Pt no showed for scheduled appointment    [] As a reminder, pt was contacted/attempted contact via phone of upcoming appointments.    [] Pt refused/declined therapy at this time due to:           [x] Pt cancelled due to:  [x] No Reason Given   [] Sick/ill   [] Other:  Rescheduled for tomorrow    [] Evaluation held by RN/Provider due to:    [] High Heart Rate   [] High Blood Pressure   [] Orthopedic Consult   [] Hgb < 7   [] Other:    [] Pt does not require skilled services due to:      Therapist/Assistant will attempt to see this patient, at our earliest opportunity.       Mary Carmen Elise, PTA Date: 6/3/2024

## 2024-06-05 ENCOUNTER — HOSPITAL ENCOUNTER (OUTPATIENT)
Dept: PHYSICAL THERAPY | Age: 69
Setting detail: THERAPIES SERIES
Discharge: HOME | End: 2024-06-05
Payer: COMMERCIAL

## 2024-06-05 PROCEDURE — 97110 THERAPEUTIC EXERCISES: CPT

## 2024-06-05 PROCEDURE — G0283 ELEC STIM OTHER THAN WOUND: HCPCS

## 2024-06-05 NOTE — PROGRESS NOTES
Phone: [phone]                 [facility]           Fax: [phone]                           Outpatient Physical Therapy                                                                            Daily Note    Patient: Litzy Lou : 1955  [facility] #: [id number]   Referring Physician: Elder Montelongo MD  Date: 2024       Treatment Diagnosis: R knee pain    Onset Date: 24  PT Insurance Information: [employer]  Total # of Visits Approved: 12 Per Physician Order  Total # of Visits to Date: 11  No Show: 0  Canceled Appointment: 1    24 Plan of Care/Recert Due    Pre-Treatment Pain:  1/10  Subjective: patient denies pain prior to therapy session, states she had a ximena horse behind R knee.    Exercises:  Exercise 1: HEP: heel prop, heel slides, quad sets  Exercise 3: SciFIT: level 4 x 10 minutes - hills  Exercise 11: SB stretch, HS stretch, flexion stretch 2 x 20\"  Exercise 13: FSU, LSU 7\" 15x ea, SD 6\" 10x  Exercise 14: bryan step overs 4\" x20  Exercise 15: star trac knee extension 1 plate 2 x 10  Exercise 16: TG mini squats 2 x 15    Manual:  Joint Mobilization: PROM R knee within patient tolerance, patella mobs    Modality: IFC/CP applied to R knee to reduce soreness post session. CP applied to R ankle for edema control. CP applied to L knee to reduce pain and discomfort.     Assessment  Assessment: Increased R knee ROM and strengthening exercises with addition of knee extension and TG mini squats with good tolerance.Patient making fair progress towards long term goals with R knee AROM -2-100*. Patient limited due to L knee pain with ther ex. CP applied to B knee and R ankle due to edema present. Continue per tolerance .    Activity Tolerance  Activity Tolerance: Patient tolerated treatment well    Patient Education  Patient Education: Continue with her HEP.  Pt verbalized/demonstrated good understanding:     [x] Yes         [] No, pt required further

## 2024-07-03 ENCOUNTER — HOSPITAL ENCOUNTER (OUTPATIENT)
Dept: WOMENS IMAGING | Age: 69
Discharge: HOME | End: 2024-07-05
Payer: COMMERCIAL

## 2024-07-03 DIAGNOSIS — Z12.31 ENCOUNTER FOR SCREENING MAMMOGRAM FOR BREAST CANCER: ICD-10-CM

## 2024-07-03 PROCEDURE — 77063 BREAST TOMOSYNTHESIS BI: CPT

## 2024-10-01 ENCOUNTER — HOSPITAL ENCOUNTER (OUTPATIENT)
Dept: PREADMISSION TESTING | Age: 69
Discharge: HOME | End: 2024-10-05
Attending: ORTHOPAEDIC SURGERY | Admitting: ORTHOPAEDIC SURGERY
Payer: COMMERCIAL

## 2024-10-01 ENCOUNTER — HOSPITAL ENCOUNTER (OUTPATIENT)
Age: 69
Discharge: HOME | End: 2024-10-01
Payer: COMMERCIAL

## 2024-10-01 VITALS
HEART RATE: 61 BPM | WEIGHT: 250 LBS | SYSTOLIC BLOOD PRESSURE: 128 MMHG | BODY MASS INDEX: 47.2 KG/M2 | DIASTOLIC BLOOD PRESSURE: 80 MMHG | OXYGEN SATURATION: 98 % | RESPIRATION RATE: 16 BRPM | TEMPERATURE: 97.7 F | HEIGHT: 61 IN

## 2024-10-01 LAB
ABO + RH BLD: NORMAL
ANION GAP SERPL CALCULATED.3IONS-SCNC: 11 MMOL/L (ref 9–16)
ARM BAND NUMBER: NORMAL
BASOPHILS # BLD: 0.04 K/UL (ref 0–0.2)
BASOPHILS NFR BLD: 1 % (ref 0–2)
BLOOD BANK SAMPLE EXPIRATION: NORMAL
BLOOD GROUP ANTIBODIES SERPL: NEGATIVE
BUN SERPL-MCNC: 14 MG/DL (ref 8–23)
BUN/CREAT SERPL: 20 (ref 9–20)
CALCIUM SERPL-MCNC: 9.3 MG/DL (ref 8.6–10.4)
CHLORIDE SERPL-SCNC: 98 MMOL/L (ref 98–107)
CO2 SERPL-SCNC: 28 MMOL/L (ref 20–31)
CREAT SERPL-MCNC: 0.7 MG/DL (ref 0.5–0.9)
D DIMER PPP FEU-MCNC: 0.58 UG/ML FEU (ref 0–0.59)
EOSINOPHIL # BLD: 0.08 K/UL (ref 0–0.44)
EOSINOPHILS RELATIVE PERCENT: 1 % (ref 1–4)
ERYTHROCYTE [DISTWIDTH] IN BLOOD BY AUTOMATED COUNT: 15.9 % (ref 11.8–14.4)
GFR, ESTIMATED: >90 ML/MIN/1.73M2
GLUCOSE SERPL-MCNC: 98 MG/DL (ref 74–99)
HCT VFR BLD AUTO: 40.7 % (ref 36.3–47.1)
HGB BLD-MCNC: 13 G/DL (ref 11.9–15.1)
IMM GRANULOCYTES # BLD AUTO: 0.03 K/UL (ref 0–0.3)
IMM GRANULOCYTES NFR BLD: 0 %
LYMPHOCYTES NFR BLD: 1.58 K/UL (ref 1.1–3.7)
LYMPHOCYTES RELATIVE PERCENT: 20 % (ref 24–43)
MCH RBC QN AUTO: 27.4 PG (ref 25.2–33.5)
MCHC RBC AUTO-ENTMCNC: 31.9 G/DL (ref 28.4–34.8)
MCV RBC AUTO: 85.7 FL (ref 82.6–102.9)
MONOCYTES NFR BLD: 0.68 K/UL (ref 0.1–1.2)
MONOCYTES NFR BLD: 9 % (ref 3–12)
NEUTROPHILS NFR BLD: 69 % (ref 36–65)
NEUTS SEG NFR BLD: 5.38 K/UL (ref 1.5–8.1)
NRBC BLD-RTO: 0 PER 100 WBC
PLATELET # BLD AUTO: 343 K/UL (ref 138–453)
PMV BLD AUTO: 11.9 FL (ref 8.1–13.5)
POTASSIUM SERPL-SCNC: 3.6 MMOL/L (ref 3.7–5.3)
RBC # BLD AUTO: 4.75 M/UL (ref 3.95–5.11)
SODIUM SERPL-SCNC: 137 MMOL/L (ref 136–145)
WBC OTHER # BLD: 7.8 K/UL (ref 3.5–11.3)

## 2024-10-01 PROCEDURE — 86900 BLOOD TYPING SEROLOGIC ABO: CPT

## 2024-10-01 PROCEDURE — 87641 MR-STAPH DNA AMP PROBE: CPT

## 2024-10-01 PROCEDURE — 86850 RBC ANTIBODY SCREEN: CPT

## 2024-10-01 PROCEDURE — 80048 BASIC METABOLIC PNL TOTAL CA: CPT

## 2024-10-01 PROCEDURE — 36415 COLL VENOUS BLD VENIPUNCTURE: CPT

## 2024-10-01 PROCEDURE — 85379 FIBRIN DEGRADATION QUANT: CPT

## 2024-10-01 PROCEDURE — 86901 BLOOD TYPING SEROLOGIC RH(D): CPT

## 2024-10-01 PROCEDURE — 85025 COMPLETE CBC W/AUTO DIFF WBC: CPT

## 2024-10-01 RX ORDER — LANOLIN ALCOHOL/MO/W.PET/CERES
200 CREAM (GRAM) TOPICAL DAILY
COMMUNITY

## 2024-10-01 RX ORDER — TRANEXAMIC ACID 650 MG/1
1300 TABLET ORAL ONCE
OUTPATIENT
Start: 2024-10-01 | End: 2024-10-01

## 2024-10-01 RX ORDER — ACETAMINOPHEN 325 MG/1
650 TABLET ORAL ONCE
OUTPATIENT
Start: 2024-10-01 | End: 2024-10-01

## 2024-10-01 NOTE — PROGRESS NOTES
Spoke to pharmacist regarding the patient's allergies. Patient did receive Ancef in May 2024 for her  right TKA. Pharmacy stated low risk and reaction was low risk.

## 2024-10-01 NOTE — PROGRESS NOTES
[facility]   Preadmission Testing    Name: Litzy Lou  : 1955  Patient Phone: [phone] (home) [phone] (work)    Procedure left TKA  Date of Procedure: 10/21/24  Surgeon: Elder Montelongo MD    Ht:  154.9 cm (5' 1\")  Wt: 113.4 kg (250 lb)  Wt method:     Allergies:   Allergies   Allergen Reactions    Bactrim [Sulfamethoxazole-Trimethoprim]     Sulfa Antibiotics     Trimethoprim     Amoxicillin Itching, Rash and Swelling     Other Reaction(s): hives    Ampicillin Itching and Rash     Other Reaction(s): hives       Peanut allergy: No         Vitals:    10/01/24 1317   BP: 128/80   Pulse: 61   Resp: 16   Temp: 97.7 °F (36.5 °C)   SpO2: 98%       No LMP recorded. Patient is postmenopausal.    Do you take blood thinners?   [x] Yes    [] No         Instructed to stop blood thinners prior to procedure?    [x] Yes    [] No      [] N/A   Do you have sleep apnea?   [] Yes    [x] No     Instructed to bring CPAP machine?   [] Yes    [] No    [x] N/A   Do you have acid reflux ?   [x] Yes    [] No     Do you have  hiatal hernia?   [] Yes    [x] No    Do you ever experience motion sickness?   [x] Yes    [] No     Have you had a respiratory infection or sore throat in last 4 weeks before surgery?    [] Yes    [x] No     Do you have poorly controlled asthma or COPD?   [] Yes    [x] No     Do you have a history of angina in the last month or symptomatic arrhythmia?   [] Yes    [x] No     Do you have significant central nervous system disease?   [] Yes    [x] No     Have you had an EKG, labs, or chest xray in last 12 months?  If yes provide copies to anesthesia   [x] Yes    [] No       [x] Lab    [x] EKG    [] CXR     Have you had a stress test?     [] Yes    [x] No    When/where:    Was it normal?    [] Yes    [] No   Do you or your family have a history of Malignant Hyperthermia? [] Yes    [x] No     Patient instructed on:   [x] NPO Status   [x] Meds to Take Day of Surgery  [x] Ride Home  [x]No

## 2024-10-02 LAB
MRSA, DNA, NASAL: NEGATIVE
SPECIMEN DESCRIPTION: NORMAL

## 2024-10-18 ENCOUNTER — ANESTHESIA EVENT (OUTPATIENT)
Dept: OPERATING ROOM | Age: 69
End: 2024-10-18
Payer: COMMERCIAL

## 2024-10-21 ENCOUNTER — ANESTHESIA (OUTPATIENT)
Dept: OPERATING ROOM | Age: 69
End: 2024-10-21
Payer: COMMERCIAL

## 2024-10-21 ENCOUNTER — HOSPITAL ENCOUNTER (OUTPATIENT)
Age: 69
Setting detail: OBSERVATION
Discharge: HOME | End: 2024-10-22
Attending: ORTHOPAEDIC SURGERY | Admitting: ORTHOPAEDIC SURGERY
Payer: COMMERCIAL

## 2024-10-21 DIAGNOSIS — Z96.652 S/P TKR (TOTAL KNEE REPLACEMENT), LEFT: Primary | ICD-10-CM

## 2024-10-21 PROCEDURE — 2500000003 HC RX 250 WO HCPCS: Performed by: NURSE ANESTHETIST, CERTIFIED REGISTERED

## 2024-10-21 PROCEDURE — 2580000003 HC RX 258: Performed by: ORTHOPAEDIC SURGERY

## 2024-10-21 PROCEDURE — 3700000000 HC ANESTHESIA ATTENDED CARE: Performed by: ORTHOPAEDIC SURGERY

## 2024-10-21 PROCEDURE — G0378 HOSPITAL OBSERVATION PER HR: HCPCS

## 2024-10-21 PROCEDURE — 7100000001 HC PACU RECOVERY - ADDTL 15 MIN: Performed by: ORTHOPAEDIC SURGERY

## 2024-10-21 PROCEDURE — 6370000000 HC RX 637 (ALT 250 FOR IP): Performed by: NURSE PRACTITIONER

## 2024-10-21 PROCEDURE — 6360000002 HC RX W HCPCS: Performed by: NURSE ANESTHETIST, CERTIFIED REGISTERED

## 2024-10-21 PROCEDURE — 2720000010 HC SURG SUPPLY STERILE: Performed by: ORTHOPAEDIC SURGERY

## 2024-10-21 PROCEDURE — 6370000000 HC RX 637 (ALT 250 FOR IP): Performed by: ORTHOPAEDIC SURGERY

## 2024-10-21 PROCEDURE — 64999 UNLISTED PX NERVOUS SYSTEM: CPT | Performed by: NURSE ANESTHETIST, CERTIFIED REGISTERED

## 2024-10-21 PROCEDURE — 3600000015 HC SURGERY LEVEL 5 ADDTL 15MIN: Performed by: ORTHOPAEDIC SURGERY

## 2024-10-21 PROCEDURE — 6360000002 HC RX W HCPCS: Performed by: ORTHOPAEDIC SURGERY

## 2024-10-21 PROCEDURE — C1776 JOINT DEVICE (IMPLANTABLE): HCPCS | Performed by: ORTHOPAEDIC SURGERY

## 2024-10-21 PROCEDURE — 2500000003 HC RX 250 WO HCPCS: Performed by: ORTHOPAEDIC SURGERY

## 2024-10-21 PROCEDURE — C1713 ANCHOR/SCREW BN/BN,TIS/BN: HCPCS | Performed by: ORTHOPAEDIC SURGERY

## 2024-10-21 PROCEDURE — 6370000000 HC RX 637 (ALT 250 FOR IP)

## 2024-10-21 PROCEDURE — 2580000003 HC RX 258: Performed by: NURSE ANESTHETIST, CERTIFIED REGISTERED

## 2024-10-21 PROCEDURE — 3600000005 HC SURGERY LEVEL 5 BASE: Performed by: ORTHOPAEDIC SURGERY

## 2024-10-21 PROCEDURE — 2580000003 HC RX 258

## 2024-10-21 PROCEDURE — 94761 N-INVAS EAR/PLS OXIMETRY MLT: CPT

## 2024-10-21 PROCEDURE — 94150 VITAL CAPACITY TEST: CPT

## 2024-10-21 PROCEDURE — 97166 OT EVAL MOD COMPLEX 45 MIN: CPT

## 2024-10-21 PROCEDURE — 97162 PT EVAL MOD COMPLEX 30 MIN: CPT

## 2024-10-21 PROCEDURE — 64447 NJX AA&/STRD FEMORAL NRV IMG: CPT | Performed by: NURSE ANESTHETIST, CERTIFIED REGISTERED

## 2024-10-21 PROCEDURE — 3700000001 HC ADD 15 MINUTES (ANESTHESIA): Performed by: ORTHOPAEDIC SURGERY

## 2024-10-21 PROCEDURE — 7100000000 HC PACU RECOVERY - FIRST 15 MIN: Performed by: ORTHOPAEDIC SURGERY

## 2024-10-21 PROCEDURE — 2709999900 HC NON-CHARGEABLE SUPPLY: Performed by: ORTHOPAEDIC SURGERY

## 2024-10-21 DEVICE — EXTENSION STEM L30MM DIA14MM KNEE TAPR CEM PERSONA: Type: IMPLANTABLE DEVICE | Site: KNEE | Status: FUNCTIONAL

## 2024-10-21 DEVICE — IMPL KNEE FEMUR PERSONA SZ 7: Type: IMPLANTABLE DEVICE | Site: KNEE | Status: FUNCTIONAL

## 2024-10-21 DEVICE — PSN MC VE ASF L 11MM 6-7/EF: Type: IMPLANTABLE DEVICE | Status: FUNCTIONAL

## 2024-10-21 DEVICE — EXTENSION STEM SZ E 5DEG L TIBL KNEE CEM NAT TIB PERSONA: Type: IMPLANTABLE DEVICE | Site: KNEE | Status: FUNCTIONAL

## 2024-10-21 DEVICE — COMPONENT PAT DIA32MM THK8.5MM STD KNEE VIVACIT-E CEM: Type: IMPLANTABLE DEVICE | Site: KNEE | Status: FUNCTIONAL

## 2024-10-21 DEVICE — CEMENT BNE 40GM HI VISC RADPQ FOR REV SURG: Type: IMPLANTABLE DEVICE | Site: KNEE | Status: FUNCTIONAL

## 2024-10-21 RX ORDER — TRANEXAMIC ACID 650 MG/1
1300 TABLET ORAL ONCE
Status: COMPLETED | OUTPATIENT
Start: 2024-10-21 | End: 2024-10-21

## 2024-10-21 RX ORDER — HYDROMORPHONE HYDROCHLORIDE 2 MG/ML
0.5 INJECTION, SOLUTION INTRAMUSCULAR; INTRAVENOUS; SUBCUTANEOUS
Status: DISCONTINUED | OUTPATIENT
Start: 2024-10-21 | End: 2024-10-22

## 2024-10-21 RX ORDER — MIDAZOLAM HYDROCHLORIDE 1 MG/ML
INJECTION, SOLUTION INTRAMUSCULAR; INTRAVENOUS
Status: DISCONTINUED | OUTPATIENT
Start: 2024-10-21 | End: 2024-10-21 | Stop reason: SDUPTHER

## 2024-10-21 RX ORDER — SODIUM CHLORIDE 9 MG/ML
INJECTION, SOLUTION INTRAMUSCULAR; INTRAVENOUS; SUBCUTANEOUS
Status: DISCONTINUED | OUTPATIENT
Start: 2024-10-21 | End: 2024-10-21 | Stop reason: SDUPTHER

## 2024-10-21 RX ORDER — VITAMIN B COMPLEX
2000 TABLET ORAL DAILY
Status: DISCONTINUED | OUTPATIENT
Start: 2024-10-21 | End: 2024-10-22 | Stop reason: HOSPADM

## 2024-10-21 RX ORDER — DEXAMETHASONE SODIUM PHOSPHATE 10 MG/ML
INJECTION, SOLUTION INTRAMUSCULAR; INTRAVENOUS
Status: DISCONTINUED | OUTPATIENT
Start: 2024-10-21 | End: 2024-10-21 | Stop reason: SDUPTHER

## 2024-10-21 RX ORDER — VANCOMYCIN HYDROCHLORIDE 1 G/20ML
INJECTION, POWDER, LYOPHILIZED, FOR SOLUTION INTRAVENOUS PRN
Status: DISCONTINUED | OUTPATIENT
Start: 2024-10-21 | End: 2024-10-21 | Stop reason: ALTCHOICE

## 2024-10-21 RX ORDER — ACETAMINOPHEN 325 MG/1
650 TABLET ORAL EVERY 6 HOURS
Status: DISCONTINUED | OUTPATIENT
Start: 2024-10-21 | End: 2024-10-22 | Stop reason: HOSPADM

## 2024-10-21 RX ORDER — SODIUM CHLORIDE 0.9 % (FLUSH) 0.9 %
5-40 SYRINGE (ML) INJECTION PRN
Status: DISCONTINUED | OUTPATIENT
Start: 2024-10-21 | End: 2024-10-21 | Stop reason: HOSPADM

## 2024-10-21 RX ORDER — BUPIVACAINE/KETOROLAC/KETAMINE 150-60/50
SYRINGE (ML) INJECTION
Status: DISCONTINUED
Start: 2024-10-21 | End: 2024-10-21

## 2024-10-21 RX ORDER — DOCUSATE SODIUM 100 MG/1
100 CAPSULE, LIQUID FILLED ORAL 2 TIMES DAILY PRN
Status: DISCONTINUED | OUTPATIENT
Start: 2024-10-21 | End: 2024-10-22 | Stop reason: HOSPADM

## 2024-10-21 RX ORDER — GABAPENTIN 300 MG/1
300 CAPSULE ORAL ONCE
Status: COMPLETED | OUTPATIENT
Start: 2024-10-21 | End: 2024-10-21

## 2024-10-21 RX ORDER — HYDROCODONE BITARTRATE AND ACETAMINOPHEN 5; 325 MG/1; MG/1
1 TABLET ORAL EVERY 6 HOURS PRN
Status: DISCONTINUED | OUTPATIENT
Start: 2024-10-21 | End: 2024-10-22 | Stop reason: HOSPADM

## 2024-10-21 RX ORDER — SODIUM CHLORIDE 0.9 % (FLUSH) 0.9 %
5-40 SYRINGE (ML) INJECTION EVERY 12 HOURS SCHEDULED
Status: DISCONTINUED | OUTPATIENT
Start: 2024-10-21 | End: 2024-10-22 | Stop reason: HOSPADM

## 2024-10-21 RX ORDER — FERROUS SULFATE 325(65) MG
325 TABLET ORAL
Status: DISCONTINUED | OUTPATIENT
Start: 2024-10-22 | End: 2024-10-22 | Stop reason: HOSPADM

## 2024-10-21 RX ORDER — SODIUM CHLORIDE 0.9 % (FLUSH) 0.9 %
5-40 SYRINGE (ML) INJECTION PRN
Status: DISCONTINUED | OUTPATIENT
Start: 2024-10-21 | End: 2024-10-22 | Stop reason: HOSPADM

## 2024-10-21 RX ORDER — SODIUM CHLORIDE 9 MG/ML
INJECTION, SOLUTION INTRAVENOUS PRN
Status: DISCONTINUED | OUTPATIENT
Start: 2024-10-21 | End: 2024-10-22 | Stop reason: HOSPADM

## 2024-10-21 RX ORDER — FENTANYL CITRATE 50 UG/ML
50 INJECTION, SOLUTION INTRAMUSCULAR; INTRAVENOUS EVERY 5 MIN PRN
Status: DISCONTINUED | OUTPATIENT
Start: 2024-10-21 | End: 2024-10-21 | Stop reason: HOSPADM

## 2024-10-21 RX ORDER — ONDANSETRON 2 MG/ML
4 INJECTION INTRAMUSCULAR; INTRAVENOUS ONCE
Status: COMPLETED | OUTPATIENT
Start: 2024-10-21 | End: 2024-10-21

## 2024-10-21 RX ORDER — LANOLIN ALCOHOL/MO/W.PET/CERES
200 CREAM (GRAM) TOPICAL NIGHTLY
Status: DISCONTINUED | OUTPATIENT
Start: 2024-10-21 | End: 2024-10-22 | Stop reason: HOSPADM

## 2024-10-21 RX ORDER — SODIUM CHLORIDE, SODIUM LACTATE, POTASSIUM CHLORIDE, CALCIUM CHLORIDE 600; 310; 30; 20 MG/100ML; MG/100ML; MG/100ML; MG/100ML
INJECTION, SOLUTION INTRAVENOUS CONTINUOUS
Status: DISCONTINUED | OUTPATIENT
Start: 2024-10-21 | End: 2024-10-22

## 2024-10-21 RX ORDER — PANTOPRAZOLE SODIUM 40 MG/1
40 TABLET, DELAYED RELEASE ORAL
Status: DISCONTINUED | OUTPATIENT
Start: 2024-10-22 | End: 2024-10-22 | Stop reason: HOSPADM

## 2024-10-21 RX ORDER — ACETAMINOPHEN 325 MG/1
650 TABLET ORAL ONCE
Status: COMPLETED | OUTPATIENT
Start: 2024-10-21 | End: 2024-10-21

## 2024-10-21 RX ORDER — ACETAMINOPHEN 325 MG/1
650 TABLET ORAL EVERY 6 HOURS PRN
Status: DISCONTINUED | OUTPATIENT
Start: 2024-10-21 | End: 2024-10-22 | Stop reason: HOSPADM

## 2024-10-21 RX ORDER — PRAVASTATIN SODIUM 20 MG
40 TABLET ORAL NIGHTLY
Status: DISCONTINUED | OUTPATIENT
Start: 2024-10-21 | End: 2024-10-22 | Stop reason: HOSPADM

## 2024-10-21 RX ORDER — CITALOPRAM HYDROBROMIDE 20 MG/1
20 TABLET ORAL DAILY
Status: DISCONTINUED | OUTPATIENT
Start: 2024-10-22 | End: 2024-10-22 | Stop reason: HOSPADM

## 2024-10-21 RX ORDER — SODIUM CHLORIDE 9 MG/ML
INJECTION, SOLUTION INTRAVENOUS PRN
Status: DISCONTINUED | OUTPATIENT
Start: 2024-10-21 | End: 2024-10-21 | Stop reason: HOSPADM

## 2024-10-21 RX ORDER — ONDANSETRON 2 MG/ML
INJECTION INTRAMUSCULAR; INTRAVENOUS
Status: DISCONTINUED | OUTPATIENT
Start: 2024-10-21 | End: 2024-10-21 | Stop reason: SDUPTHER

## 2024-10-21 RX ORDER — ENOXAPARIN SODIUM 100 MG/ML
30 INJECTION SUBCUTANEOUS 2 TIMES DAILY
Status: DISCONTINUED | OUTPATIENT
Start: 2024-10-22 | End: 2024-10-22 | Stop reason: HOSPADM

## 2024-10-21 RX ORDER — CHLORTHALIDONE 25 MG/1
25 TABLET ORAL DAILY
Status: DISCONTINUED | OUTPATIENT
Start: 2024-10-22 | End: 2024-10-22 | Stop reason: HOSPADM

## 2024-10-21 RX ORDER — ATENOLOL 50 MG/1
100 TABLET ORAL DAILY
Status: DISCONTINUED | OUTPATIENT
Start: 2024-10-22 | End: 2024-10-22 | Stop reason: HOSPADM

## 2024-10-21 RX ORDER — SODIUM CHLORIDE, SODIUM LACTATE, POTASSIUM CHLORIDE, CALCIUM CHLORIDE 600; 310; 30; 20 MG/100ML; MG/100ML; MG/100ML; MG/100ML
INJECTION, SOLUTION INTRAVENOUS CONTINUOUS
Status: DISCONTINUED | OUTPATIENT
Start: 2024-10-21 | End: 2024-10-21 | Stop reason: HOSPADM

## 2024-10-21 RX ORDER — HYDROCODONE BITARTRATE AND ACETAMINOPHEN 5; 325 MG/1; MG/1
2 TABLET ORAL EVERY 6 HOURS PRN
Status: DISCONTINUED | OUTPATIENT
Start: 2024-10-21 | End: 2024-10-22 | Stop reason: HOSPADM

## 2024-10-21 RX ORDER — SODIUM CHLORIDE 0.9 % (FLUSH) 0.9 %
5-40 SYRINGE (ML) INJECTION EVERY 12 HOURS SCHEDULED
Status: DISCONTINUED | OUTPATIENT
Start: 2024-10-21 | End: 2024-10-21 | Stop reason: HOSPADM

## 2024-10-21 RX ORDER — NALOXONE HYDROCHLORIDE 0.4 MG/ML
INJECTION, SOLUTION INTRAMUSCULAR; INTRAVENOUS; SUBCUTANEOUS PRN
Status: DISCONTINUED | OUTPATIENT
Start: 2024-10-21 | End: 2024-10-21 | Stop reason: HOSPADM

## 2024-10-21 RX ORDER — BUPIVACAINE/KETOROLAC/KETAMINE 150-60/50
SYRINGE (ML) INJECTION PRN
Status: DISCONTINUED | OUTPATIENT
Start: 2024-10-21 | End: 2024-10-21 | Stop reason: ALTCHOICE

## 2024-10-21 RX ORDER — FENTANYL CITRATE 50 UG/ML
INJECTION, SOLUTION INTRAMUSCULAR; INTRAVENOUS
Status: DISCONTINUED | OUTPATIENT
Start: 2024-10-21 | End: 2024-10-21 | Stop reason: SDUPTHER

## 2024-10-21 RX ORDER — METFORMIN HYDROCHLORIDE 500 MG/1
500 TABLET, EXTENDED RELEASE ORAL
Status: DISCONTINUED | OUTPATIENT
Start: 2024-10-21 | End: 2024-10-22 | Stop reason: HOSPADM

## 2024-10-21 RX ORDER — BUPIVACAINE HYDROCHLORIDE 7.5 MG/ML
INJECTION, SOLUTION INTRASPINAL
Status: DISCONTINUED | OUTPATIENT
Start: 2024-10-21 | End: 2024-10-21 | Stop reason: SDUPTHER

## 2024-10-21 RX ORDER — LIDOCAINE HYDROCHLORIDE 20 MG/ML
INJECTION, SOLUTION EPIDURAL; INFILTRATION; INTRACAUDAL; PERINEURAL
Status: DISCONTINUED | OUTPATIENT
Start: 2024-10-21 | End: 2024-10-21 | Stop reason: SDUPTHER

## 2024-10-21 RX ORDER — DIMENHYDRINATE 50 MG
50 TABLET ORAL ONCE
Status: COMPLETED | OUTPATIENT
Start: 2024-10-21 | End: 2024-10-21

## 2024-10-21 RX ORDER — TRANEXAMIC ACID 100 MG/ML
INJECTION, SOLUTION INTRAVENOUS PRN
Status: DISCONTINUED | OUTPATIENT
Start: 2024-10-21 | End: 2024-10-21 | Stop reason: ALTCHOICE

## 2024-10-21 RX ORDER — ACETAMINOPHEN 325 MG/1
650 TABLET ORAL ONCE
Status: DISCONTINUED | OUTPATIENT
Start: 2024-10-21 | End: 2024-10-21 | Stop reason: HOSPADM

## 2024-10-21 RX ORDER — ONDANSETRON 2 MG/ML
4 INJECTION INTRAMUSCULAR; INTRAVENOUS
Status: DISCONTINUED | OUTPATIENT
Start: 2024-10-21 | End: 2024-10-21 | Stop reason: HOSPADM

## 2024-10-21 RX ORDER — FENTANYL CITRATE 50 UG/ML
25 INJECTION, SOLUTION INTRAMUSCULAR; INTRAVENOUS EVERY 5 MIN PRN
Status: DISCONTINUED | OUTPATIENT
Start: 2024-10-21 | End: 2024-10-21 | Stop reason: HOSPADM

## 2024-10-21 RX ORDER — ROPIVACAINE HYDROCHLORIDE 5 MG/ML
INJECTION, SOLUTION EPIDURAL; INFILTRATION; PERINEURAL
Status: DISCONTINUED | OUTPATIENT
Start: 2024-10-21 | End: 2024-10-21 | Stop reason: SDUPTHER

## 2024-10-21 RX ADMIN — FENTANYL CITRATE 25 MCG: 50 INJECTION INTRAMUSCULAR; INTRAVENOUS at 10:22

## 2024-10-21 RX ADMIN — HYDROCODONE BITARTRATE AND ACETAMINOPHEN 1 TABLET: 5; 325 TABLET ORAL at 20:45

## 2024-10-21 RX ADMIN — ROPIVACAINE HYDROCHLORIDE 15 ML: 5 INJECTION EPIDURAL; INFILTRATION; PERINEURAL at 07:25

## 2024-10-21 RX ADMIN — TRANEXAMIC ACID 1300 MG: 650 TABLET ORAL at 12:37

## 2024-10-21 RX ADMIN — METFORMIN HYDROCHLORIDE 500 MG: 500 TABLET, EXTENDED RELEASE ORAL at 15:21

## 2024-10-21 RX ADMIN — Medication 2000 UNITS: at 15:21

## 2024-10-21 RX ADMIN — ONDANSETRON 4 MG: 2 INJECTION, SOLUTION INTRAMUSCULAR; INTRAVENOUS at 10:45

## 2024-10-21 RX ADMIN — ROPIVACAINE HYDROCHLORIDE 15 ML: 5 INJECTION EPIDURAL; INFILTRATION; PERINEURAL at 07:20

## 2024-10-21 RX ADMIN — DIMENHYDRINATE 50 MG: 50 TABLET ORAL at 06:49

## 2024-10-21 RX ADMIN — SODIUM CHLORIDE, PRESERVATIVE FREE 10 ML: 5 INJECTION INTRAVENOUS at 20:45

## 2024-10-21 RX ADMIN — SODIUM CHLORIDE, POTASSIUM CHLORIDE, SODIUM LACTATE AND CALCIUM CHLORIDE: 600; 310; 30; 20 INJECTION, SOLUTION INTRAVENOUS at 15:27

## 2024-10-21 RX ADMIN — ONDANSETRON 4 MG: 2 INJECTION, SOLUTION INTRAMUSCULAR; INTRAVENOUS at 06:55

## 2024-10-21 RX ADMIN — HYDROMORPHONE HYDROCHLORIDE 0.5 MG: 2 INJECTION, SOLUTION INTRAMUSCULAR; INTRAVENOUS; SUBCUTANEOUS at 18:39

## 2024-10-21 RX ADMIN — ACETAMINOPHEN 650 MG: 325 TABLET ORAL at 06:49

## 2024-10-21 RX ADMIN — FENTANYL CITRATE 25 MCG: 50 INJECTION INTRAMUSCULAR; INTRAVENOUS at 09:51

## 2024-10-21 RX ADMIN — SODIUM CHLORIDE 15 ML: 9 INJECTION INTRAMUSCULAR; INTRAVENOUS; SUBCUTANEOUS at 07:25

## 2024-10-21 RX ADMIN — PRAVASTATIN SODIUM 40 MG: 20 TABLET ORAL at 20:45

## 2024-10-21 RX ADMIN — SODIUM CHLORIDE, POTASSIUM CHLORIDE, SODIUM LACTATE AND CALCIUM CHLORIDE: 600; 310; 30; 20 INJECTION, SOLUTION INTRAVENOUS at 06:47

## 2024-10-21 RX ADMIN — DEXAMETHASONE SODIUM PHOSPHATE 5 MG: 10 INJECTION, SOLUTION INTRAMUSCULAR; INTRAVENOUS at 07:25

## 2024-10-21 RX ADMIN — Medication 200 MG: at 20:46

## 2024-10-21 RX ADMIN — GABAPENTIN 300 MG: 300 CAPSULE ORAL at 06:49

## 2024-10-21 RX ADMIN — ACETAMINOPHEN 650 MG: 325 TABLET ORAL at 18:39

## 2024-10-21 RX ADMIN — ACETAMINOPHEN 650 MG: 325 TABLET ORAL at 12:37

## 2024-10-21 RX ADMIN — LIDOCAINE HYDROCHLORIDE 60 MG: 20 INJECTION, SOLUTION EPIDURAL; INFILTRATION; INTRACAUDAL; PERINEURAL at 08:24

## 2024-10-21 RX ADMIN — CEFAZOLIN 2000 MG: 2 INJECTION, POWDER, FOR SOLUTION INTRAVENOUS at 15:21

## 2024-10-21 RX ADMIN — FENTANYL CITRATE 50 MCG: 50 INJECTION INTRAMUSCULAR; INTRAVENOUS at 07:09

## 2024-10-21 RX ADMIN — BUPIVACAINE HYDROCHLORIDE IN DEXTROSE 1.8 ML: 7.5 INJECTION, SOLUTION SUBARACHNOID at 08:21

## 2024-10-21 RX ADMIN — DEXAMETHASONE SODIUM PHOSPHATE 5 MG: 10 INJECTION, SOLUTION INTRAMUSCULAR; INTRAVENOUS at 07:20

## 2024-10-21 RX ADMIN — TRANEXAMIC ACID 1300 MG: 650 TABLET ORAL at 18:39

## 2024-10-21 RX ADMIN — MIDAZOLAM 1 MG: 1 INJECTION INTRAMUSCULAR; INTRAVENOUS at 08:09

## 2024-10-21 RX ADMIN — WATER 2000 MG: 1 INJECTION INTRAMUSCULAR; INTRAVENOUS; SUBCUTANEOUS at 08:07

## 2024-10-21 RX ADMIN — MIDAZOLAM 1 MG: 1 INJECTION INTRAMUSCULAR; INTRAVENOUS at 07:09

## 2024-10-21 RX ADMIN — TRANEXAMIC ACID 1300 MG: 650 TABLET ORAL at 06:49

## 2024-10-21 ASSESSMENT — PAIN - FUNCTIONAL ASSESSMENT
PAIN_FUNCTIONAL_ASSESSMENT: NONE - DENIES PAIN
PAIN_FUNCTIONAL_ASSESSMENT: PREVENTS OR INTERFERES SOME ACTIVE ACTIVITIES AND ADLS
PAIN_FUNCTIONAL_ASSESSMENT: PREVENTS OR INTERFERES SOME ACTIVE ACTIVITIES AND ADLS
PAIN_FUNCTIONAL_ASSESSMENT: 0-10
PAIN_FUNCTIONAL_ASSESSMENT: NONE - DENIES PAIN

## 2024-10-21 ASSESSMENT — LIFESTYLE VARIABLES: SMOKING_STATUS: 0

## 2024-10-21 ASSESSMENT — PAIN DESCRIPTION - DESCRIPTORS
DESCRIPTORS: ACHING

## 2024-10-21 ASSESSMENT — PAIN DESCRIPTION - ONSET: ONSET: ON-GOING

## 2024-10-21 ASSESSMENT — PAIN DESCRIPTION - LOCATION
LOCATION: LEG;KNEE
LOCATION: KNEE

## 2024-10-21 ASSESSMENT — PAIN DESCRIPTION - PAIN TYPE: TYPE: SURGICAL PAIN

## 2024-10-21 ASSESSMENT — PAIN DESCRIPTION - ORIENTATION: ORIENTATION: LEFT

## 2024-10-21 ASSESSMENT — PAIN SCALES - GENERAL
PAINLEVEL_OUTOF10: 0
PAINLEVEL_OUTOF10: 7
PAINLEVEL_OUTOF10: 5
PAINLEVEL_OUTOF10: 4

## 2024-10-21 ASSESSMENT — PAIN DESCRIPTION - FREQUENCY: FREQUENCY: INTERMITTENT

## 2024-10-21 NOTE — CARE COORDINATION
Case Management Assessment  Initial Evaluation    Date/Time of Evaluation: 10/21/2024 1:23 PM  Assessment Completed by: Ana María Deshpande RN    If patient is discharged prior to next notation, then this note serves as note for discharge by case management.    Patient Name: Litzy Lou                   YOB: 1955  Diagnosis: Primary osteoarthritis of left knee [M17.12]  S/P total knee replacement using cement, left [Z96.652]                   Date / Time: 10/21/2024  6:09 AM    Patient Admission Status: Observation   Readmission Risk (Low < 19, Mod (19-27), High > 27): No data recorded  Current PCP: Mayra Cedillo APRN - NP  PCP verified by CM? (P) Yes    Chart Reviewed: Yes      History Provided by: (P) Patient  Patient Orientation: (P) Alert and Oriented, Person, Place, Situation    Patient Cognition: (P) Alert    Hospitalization in the last 30 days (Readmission):  No    If yes, Readmission Assessment in  Navigator will be completed.    Advance Directives:      Code Status: Full Code   Patient's Primary Decision Maker is: (P) Legal Next of Kin    Primary Decision Maker: Prince Lou - Spouse - [phone]    Discharge Planning:    Patient lives with: (P) Spouse/Significant Other Type of Home: (P) House  Primary Care Giver: (P) Self  Patient Support Systems include: (P) Spouse/Significant Other, Family Members, Friends/Neighbors   Current Financial resources: (P) Medicare  Current community resources: (P) None  Current services prior to admission: (P) None            Current DME:              Type of Home Care services:  (P) None    ADLS  Prior functional level: (P) Independent in ADLs/IADLs  Current functional level: (P) Assistance with the following:, Shopping, Housework    PT AM-PAC:   /24  OT AM-PAC:   /24    Family can provide assistance at DC: (P) Yes  Would you like Case Management to discuss the discharge plan with any other family members/significant others, and if so, who? (P)

## 2024-10-21 NOTE — OP NOTE
compound.  Undersurface of the patella was then osteotomized.  It was elected to go with a 32 mm patellar dome.  [place] holes were placed.  The knee was then cleansed with Simpulse and gentamicin irrigation solution.  Trial reduction was then carried out.  Knee was noted to be well balanced, both in flexion and extension, with about 120 degrees of flexion.  Preoperatively, the patient had range of motion from about -10 to 80 degrees of flexion.  The tourniquet was then elevated to 350 mmHg.  Methylmethacrylate was prepared.  The knee was again cleansed with Simpulse and dried.  The tibial component with a 30 mm stem extension was then inserted and cemented in place, and held until the cement hardened.  All excess cement was removed.  A second batch of cement was then prepared.  The femoral component was cemented in place, followed by the patellar dome.  All excess cement was removed.  Knee was irrigated out with Simpulse.  11 mm MC poly was inserted.  Knee was well balanced, both in flexion and extension.  The undersurface of the patella was then covered with a 32 mm patellar dome.  The tourniquet was released.  Wound was irrigated out.  Good hemostasis was noted.  Topical TXA was used, 1 g.  Vancomycin powder was sprinkled in the wound and the deep fascial structure was closed with #1 Vicryl figure-of-eight sutures over 2 Hemovac drains.  Remainder of the gram of vancomycin was sprinkled in the subcu layer and this was closed in layers with #1 Vicryl and 2-0 Vicryl, followed by 3-0 Monocryl subcuticular stitch.  Wound was dressed with Steri-Strips, fluffs, ABD, Kerlix roll, and an Ace wrap from toe to groin.  Estimated blood loss was 1000 mL.  The patient was transferred to Recovery in a stable condition.          AURELIA CARRERA MD      D:  10/21/2024 11:54:46     T:  10/21/2024 13:01:09     [facility]/S  Job #:  [id number]     Doc#:  [id number]

## 2024-10-21 NOTE — PROGRESS NOTES
Physical Therapy  Facility/Department: [facility] MED SURG  Physical Therapy Initial Assessment    Name: Litzy Lou  : 1955  MRN: [id number]  Date of Service: 10/21/2024    Discharge Recommendations:  Continue to assess pending progress, Outpatient PT   PT Equipment Recommendations  Equipment Needed: No      Patient Diagnosis(es): L TKA, L knee OA  Past Medical History:  has a past medical history of Acid reflux, Ankle edema, bilateral, Arthritis, Hyperlipidemia, MVP (mitral valve prolapse), S/P TKR (total knee replacement), left, and Thyroid disease.  Past Surgical History:  has a past surgical history that includes Arm Surgery (Right, ); pr colon ca scrn not hi rsk ind (N/A, 2017); Colonoscopy (2017); Mole removal; and Total knee arthroplasty (Right, 2024).    Assessment  Body Structures, Functions, Activity Limitations Requiring Skilled Therapeutic Intervention: Decreased functional mobility ;Decreased ADL status;Decreased ROM;Decreased body mechanics;Decreased strength;Decreased endurance;Decreased balance;Decreased high-level IADLs;Increased pain;Decreased posture  Assessment: The patient is a 68 y.o. female who is s/p L TKA.  She demonstates decreased L knee ROM, decreased LE strength, impaired balance, and decreased activity endurance.  She would benefit from skilled PT to address her deficits to improve functional mobility.  Reviewed exercises to continue with her HEP.  Treatment Diagnosis: s/p L TKA, generalized weakness  Therapy Prognosis: Good  Decision Making: Medium Complexity  Requires PT Follow-Up: Yes  Activity Tolerance  Activity Tolerance: Patient tolerated evaluation without incident    Plan  Physical Therapy Plan  General Plan: 2 times a day 7 days a week (1x/day on weekends and holidays)  Current Treatment Recommendations: Strengthening, ROM, Balance training, Functional mobility training, Transfer training, IADL training, ADL/Self-care training, Gait training,

## 2024-10-21 NOTE — PROGRESS NOTES
Occupational Therapy  Facility/Department: [facility] MED SURG  Occupational Therapy Initial Assessment    Name: Litzy Lou  : 1955  MRN: [id number]  Date of Service: 10/21/2024    Discharge Recommendations:  Continue to assess pending progress, Home with assist PRN          Patient Diagnosis(es): L TKA  Past Medical History:  has a past medical history of Acid reflux, Ankle edema, bilateral, Arthritis, Hyperlipidemia, MVP (mitral valve prolapse), S/P TKR (total knee replacement), left, and Thyroid disease.  Past Surgical History:  has a past surgical history that includes Arm Surgery (Right, ); pr colon ca scrn not hi rsk ind (N/A, 2017); Colonoscopy (2017); Mole removal; and Total knee arthroplasty (Right, 2024).    Treatment Diagnosis: Weakness      Assessment  Performance deficits / Impairments: Decreased functional mobility ;Decreased endurance;Decreased ADL status;Decreased balance;Decreased high-level IADLs  Assessment: 67 y/o F admitted to T for elective L TKA resulting in increased melania for assist during ADL. Patient would benefit from OT services to address to ensure safe and independent return home.  Treatment Diagnosis: Weakness  Prognosis: Good  Decision Making: Medium Complexity  REQUIRES OT FOLLOW-UP: Yes     Plan  Occupational Therapy Plan  Times Per Day: Once a day  Days Per Week: 7 Days  Current Treatment Recommendations: Balance training, Functional mobility training, Safety education & training, Patient/Caregiver education & training, Equipment evaluation, education, & procurement, Endurance training, Self-Care / ADL, Home management training    Restrictions  Restrictions/Precautions  Restrictions/Precautions: Fall Risk, General Precautions  Required Braces or Orthoses?: No    Subjective  General  Patient assessed for rehabilitation services?: Yes  Subjective  Subjective: Patient states pain 3-4/10 L knee. Patient sitting in chair upon arrival, agreeable to OT

## 2024-10-21 NOTE — ANESTHESIA PROCEDURE NOTES
Peripheral Block    Patient location during procedure: pre-op  Reason for block: post-op pain management and at surgeon's request  Start time: 10/21/2024 7:10 AM  End time: 10/21/2024 7:25 AM  Staffing  Performed: resident/CRNA   Resident/CRNA: Desiree Forde APRN - CRNA  Performed by: Desiree Forde APRN - CRNA  Authorized by: Desiree Forde APRN - CRNA    Preanesthetic Checklist  Completed: patient identified, IV checked, site marked, risks and benefits discussed, surgical/procedural consents, equipment checked, pre-op evaluation, timeout performed, anesthesia consent given, oxygen available, monitors applied/VS acknowledged, fire risk safety assessment completed and verbalized and blood product R/B/A discussed and consented  Peripheral Block   Patient position: supine  Prep: ChloraPrep  Provider prep: mask and sterile gloves  Patient monitoring: continuous capnometry, IV access and responsive to questions  Block type: iPacks and Saphenous  Laterality: left  Injection technique: single-shot  Guidance: ultrasound guided  Local infiltration: ropivacaine and decadron  Infiltration strength: 0.5 %  Local infiltration: ropivacaine and decadron  Dose: 30 mLDose: 1 mL    Needle   Needle type: pajunk 80mm TAP.   Assessment   Injection assessment: negative aspiration for heme, no paresthesia on injection, local visualized surrounding nerve on ultrasound and no intravascular symptoms  Paresthesia pain: none  Slow fractionated injection: yes  Hemodynamics: stable  Outcomes: uncomplicated and patient tolerated procedure well    Additional Notes  Performed by Isaías BAUTISTA

## 2024-10-21 NOTE — PLAN OF CARE
Problem: Discharge Planning  Goal: Discharge to home or other facility with appropriate resources  Outcome: Progressing  Flowsheets (Taken 10/21/2024 1854)  Discharge to home or other facility with appropriate resources:   Identify barriers to discharge with patient and caregiver   Refer to discharge planning if patient needs post-hospital services based on physician order or complex needs related to functional status, cognitive ability or social support system     Problem: Pain  Goal: Verbalizes/displays adequate comfort level or baseline comfort level  Outcome: Progressing  Flowsheets (Taken 10/21/2024 1854)  Verbalizes/displays adequate comfort level or baseline comfort level:   Encourage patient to monitor pain and request assistance   Administer analgesics based on type and severity of pain and evaluate response   Assess pain using appropriate pain scale   Implement non-pharmacological measures as appropriate and evaluate response     Problem: Safety - Adult  Goal: Free from fall injury  Outcome: Progressing  Flowsheets (Taken 10/21/2024 1854)  Free From Fall Injury: Instruct family/caregiver on patient safety     Problem: ABCDS Injury Assessment  Goal: Absence of physical injury  Outcome: Progressing  Flowsheets (Taken 10/21/2024 1854)  Absence of Physical Injury: Implement safety measures based on patient assessment

## 2024-10-21 NOTE — BRIEF OP NOTE
Brief Postoperative Note      Patient: Litzy Lou  YOB: 1955  MRN: [id number]    Date of Procedure: 10/21/2024    Pre-Op Diagnosis Codes:      * Primary osteoarthritis of left knee [M17.12]    Post-Op Diagnosis: Same       Procedure(s):  KNEE TOTAL ARTHROPLASTY    Surgeon(s):  Elder Montelongo MD    Assistant:  First Assistant: Bean Davidson RN    Anesthesia: Choice    Estimated Blood Loss (mL): 1000    Complications: None    Specimens:   * No specimens in log *    Implants:  Implant Name Type Inv. Item Serial No.  Lot No. LRB No. Used Action   CEMENT BNE 40GM HI VISC RADPQ FOR REV SURG - [id number]  CEMENT BNE 40GM HI VISC RADPQ FOR REV SURG  [employer]- [id number] Left 2 Implanted   COMPONENT PAT [id number] THK8.5MM STD KNEE VIVACIT-E KT - [id number]  COMPONENT PAT [id number] THK8.5MM STD KNEE VIVACIT-E KT  [employer]- [id number] Left 1 Implanted   EXTENSION STEM L30MM [id number] KNEE TAPR KT PERSONA - [id number]  EXTENSION STEM L30MM [id number] KNEE TAPR KT PERSONA  [employer]- [id number] Left 1 Implanted   IMPL KNEE FEMUR PERSONA SZ 7 - [id number] Knee IMPL KNEE FEMUR PERSONA SZ 7  [employer]PMM [id number] Left 1 Implanted   EXTENSION STEM SZ E 5DEG L TIBL KNEE KT MEGHNA TIB PERSONA - [id number]  EXTENSION STEM SZ E 5DEG L TIBL KNEE KT MEGHNA TIB PERSONA  [employer]- [id number] Left 1 Implanted   PSN MC VE ASF L 11MM 6-7/EF - [id number]  PSN MC VE ASF L 11MM 6-7/EF  [employer]- [id number] Left 1 Implanted         Drains:   Closed/Suction Drain Right;Anterior Knee Accordion (Active)       Findings:  Infection Present At Time Of Surgery (PATOS) (choose all levels that have infection present):  No infection present  Other Findings:     Electronically signed by ELDER MONTELONGO MD on 10/21/2024 at 11:44 AM

## 2024-10-21 NOTE — PROGRESS NOTES
Patient transported via bed to [facility] Room 328 with 2 RNs and belongings. Bedside report given to Subha CHOI RN. Vitals stable, questions answered.        Discharge Criteria    Inpatients must meet Criteria 1 through 7. All other patients are either YES or N/A. If a NO is chosen then Anesthesia or Surgeon must be notified.      1.  Minimum 30 minutes after last dose of sedative medication.    Yes      2.  Systolic BP between 90 - 160. Diastolic BP between 60 - 90.    Yes      3.  Pulse between 60 - 120    Yes      4.  Respirations between 8 - 25.    Yes      5.  SpO2 92% - 100%.    Yes      6.  Able to cough and swallow or return to baseline function.    Yes      7.  Alert and oriented or return to baseline mental status.    Yes      8.  Demonstrates controlled, coordinated movements, ambulates with steady gait, or return to baseline activity function.    N/A      9.  Minimal or no pain or nausea, or at a level tolerable and acceptable to patient.    N/A      10. Takes and retains oral fluids as allowed.    N/A      11. Procedural / perioperative site stable.  Minimal or no bleeding.    N/A          12. If GI endoscopy procedure, minimal or no abdominal distention or passing flatus.    N/A      13. Written discharge instructions and emergency telephone number provided.    N/A      14. Accompanied by a responsible adult.    N/A

## 2024-10-21 NOTE — FLOWSHEET NOTE
68 year old female admitted to room 328 per bed from OR. Oriented to room and call light system. Dressing to left knee clean, dry and intact. No drainage noted. Good circulation checks noted. Ice to incision. Denies pain or nausea currently. Call light within reach. Bed alarm on for safety

## 2024-10-21 NOTE — CONSULTS
Hospitalist Consult Note      Requesting Physician:  Dr. Montelongo     Reason for consultation: Med Mgmt     SUBJECTIVE:    History of Present Illness:   The patient is a 68 y.o. female who underwent an elective left total Knee replacement by Dr. Montelongo for degenerative arthritis and pain which was uncontrolled with outpatient conservative management.  Post-op course thus far has been uncomplicated.   Her pain is well controlled post operatively.  She denies nausea and vomiting post op.  She denies any chest pain, palpitations or shortness of breath.    Past Medical History:        Diagnosis Date    Acid reflux     Ankle edema, bilateral     Arthritis     Hyperlipidemia     MVP (mitral valve prolapse)     S/P TKR (total knee replacement), left 10/21/2024    Thyroid disease        Past Surgical History:        Procedure Laterality Date    ARM SURGERY Right 1997    COLONOSCOPY  11/07/2017    -hemorrhoids    MOLE REMOVAL      nose    TN COLON CA SCRN NOT HI RSK IND N/A 11/07/2017    COLONOSCOPY performed by Ponce Delgadillo MD at [facility] OR    TOTAL KNEE ARTHROPLASTY Right 5/2/2024    KNEE TOTAL ARTHROPLASTY performed by Elder Montelongo MD at [facility] OR       Medications Prior to Admission:    Prior to Admission medications    Medication Sig Start Date End Date Taking? Authorizing Provider   metFORMIN (GLUCOPHAGE-XR) 500 MG extended release tablet Take 1 tablet by mouth 2 times daily (before meals)   Yes ProviderJose MD   levothyroxine (SYNTHROID) 137 MCG tablet Take 1 tablet by mouth Daily   Yes ProviderJose MD   pravastatin (PRAVACHOL) 40 MG tablet Take 1 tablet by mouth nightly   Yes ProviderJose MD   chlorthalidone (HYGROTON) 25 MG tablet Take 1 tablet by mouth daily   Yes ProviderJose MD   atenolol (TENORMIN) 100 MG tablet Take 1 tablet by mouth daily   Yes ProviderJose MD   citalopram (CELEXA) 40 MG tablet Take 0.5 tablets by mouth daily   Yes Provider

## 2024-10-21 NOTE — PROGRESS NOTES
Pt is a/o x4 with Left surgical knee pain, medicated per order. Drain is clamped and bloody drainage noted in device.  at bedside. Pt is pleasant and cooperative. Chair alarm on and call light in reach.

## 2024-10-21 NOTE — ANESTHESIA POSTPROCEDURE EVALUATION
Department of Anesthesiology  Postprocedure Note    Patient: Litzy Lou  MRN: [id number]  YOB: 1955  Date of evaluation: 10/21/2024    Procedure Summary       Date: 10/21/24 Room / Location: [address]    Anesthesia Start: 0804 Anesthesia Stop: 1133    Procedure: KNEE TOTAL ARTHROPLASTY (Left: Knee) Diagnosis:       Primary osteoarthritis of left knee      (Primary osteoarthritis of left knee [M17.12])    Surgeons: Elder Montelongo MD Responsible Provider: Sam Zacarias APRN - CRNA    Anesthesia Type: spinal ASA Status: 3            Anesthesia Type: No value filed.    Ivana Phase I: Ivana Score: 10    Ivana Phase II:      Anesthesia Post Evaluation    Patient location during evaluation: bedside  Patient participation: complete - patient participated  Level of consciousness: awake and alert  Airway patency: patent  Nausea & Vomiting: no nausea and no vomiting  Cardiovascular status: hemodynamically stable  Respiratory status: acceptable  Hydration status: stable  Multimodal analgesia pain management approach  Pain management: adequate    No notable events documented.

## 2024-10-21 NOTE — ANESTHESIA PRE PROCEDURE
Department of Anesthesiology  Preprocedure Note       Name:  Litzy Lou   Age:  68 y.o.  :  1955                                          MRN:  [id number]         Date:  10/21/2024      Surgeon: Surgeon(s):  Elder Montelongo MD    Procedure: Procedure(s):  COLONOSCOPY    Medications prior to admission:   Prior to Admission medications    Medication Sig Start Date End Date Taking? Authorizing Provider   metFORMIN (GLUCOPHAGE-XR) 500 MG extended release tablet Take 1 tablet by mouth 2 times daily (before meals)   Yes Jose Roman MD   levothyroxine (SYNTHROID) 137 MCG tablet Take 1 tablet by mouth Daily   Yes Jose Roman MD   pravastatin (PRAVACHOL) 40 MG tablet Take 1 tablet by mouth nightly   Yes Jose Roman MD   chlorthalidone (HYGROTON) 25 MG tablet Take 1 tablet by mouth daily   Yes Jose Roman MD   atenolol (TENORMIN) 100 MG tablet Take 1 tablet by mouth daily   Yes Jose Roman MD   citalopram (CELEXA) 40 MG tablet Take 0.5 tablets by mouth daily   Yes Jose Roman MD   omeprazole (PRILOSEC) 20 MG capsule Take 1 capsule by mouth Daily   Yes Jose Roman MD   magnesium oxide (MAG-OX) 400 (240 Mg) MG tablet Take 0.5 tablets by mouth daily    Jose Roman MD   ferrous sulfate (IRON 325) 325 (65 Fe) MG tablet Take 1 tablet by mouth daily (with breakfast)    Jose Roman MD   ibuprofen (ADVIL;MOTRIN) 200 MG tablet Take 1 tablet by mouth every 8 hours as needed for Pain    Jose Roman MD   VITAMIN D PO Take 2,000 Units by mouth Daily    Jose Roman MD   Calcium Carb-Cholecalciferol (CALCIUM 500 +D PO) Take 1 tablet by mouth daily    Jose Roman MD   Multiple Vitamins-Minerals (MULTIVITAMIN ADULT PO) Take 1 tablet by mouth daily    Jose Roman MD   tiZANidine (ZANAFLEX) 4 MG tablet Take 1 tablet by mouth every 6 hours as needed    Jose Roman MD       Current medications:

## 2024-10-21 NOTE — ANESTHESIA PROCEDURE NOTES
Spinal Block    Patient location during procedure: OR  End time: 10/21/2024 8:21 AM  Reason for block: primary anesthetic and at surgeon's request  Staffing  Performed: resident/CRNA   Resident/CRNA: Desiree Forde APRN - CRNA  Performed by: Desiree Forde APRN - CRNA  Authorized by: Desiree Forde APRN - CRNA    Spinal Block  Patient position: sitting  Prep: ChloraPrep  Patient monitoring: continuous pulse ox and frequent blood pressure checks  Approach: midline  Location: L3/L4  Provider prep: mask and sterile gloves  Local infiltration: lidocaine  Needle  Needle type: Sprotte Tip   Needle gauge: 24 G  Needle length: 4 in  Assessment  Sensory level: T8  Swirl obtained: Yes  CSF: clear  Attempts: 2  Hemodynamics: stable  Additional Notes  First attempt Isaías BAUTISTA, second attempt Desiree Forde CRNA

## 2024-10-22 VITALS
OXYGEN SATURATION: 99 % | HEART RATE: 73 BPM | BODY MASS INDEX: 45.81 KG/M2 | TEMPERATURE: 97.3 F | RESPIRATION RATE: 18 BRPM | SYSTOLIC BLOOD PRESSURE: 131 MMHG | DIASTOLIC BLOOD PRESSURE: 67 MMHG | HEIGHT: 61 IN | WEIGHT: 242.67 LBS

## 2024-10-22 LAB
HCT VFR BLD AUTO: 30.6 % (ref 36.3–47.1)
HGB BLD-MCNC: 10 G/DL (ref 11.9–15.1)

## 2024-10-22 PROCEDURE — 85018 HEMOGLOBIN: CPT

## 2024-10-22 PROCEDURE — 94761 N-INVAS EAR/PLS OXIMETRY MLT: CPT

## 2024-10-22 PROCEDURE — 97535 SELF CARE MNGMENT TRAINING: CPT

## 2024-10-22 PROCEDURE — 97530 THERAPEUTIC ACTIVITIES: CPT

## 2024-10-22 PROCEDURE — G0378 HOSPITAL OBSERVATION PER HR: HCPCS

## 2024-10-22 PROCEDURE — 6370000000 HC RX 637 (ALT 250 FOR IP): Performed by: ORTHOPAEDIC SURGERY

## 2024-10-22 PROCEDURE — 85014 HEMATOCRIT: CPT

## 2024-10-22 PROCEDURE — 96374 THER/PROPH/DIAG INJ IV PUSH: CPT

## 2024-10-22 PROCEDURE — 6360000002 HC RX W HCPCS: Performed by: ORTHOPAEDIC SURGERY

## 2024-10-22 PROCEDURE — 36415 COLL VENOUS BLD VENIPUNCTURE: CPT

## 2024-10-22 PROCEDURE — 6370000000 HC RX 637 (ALT 250 FOR IP): Performed by: NURSE PRACTITIONER

## 2024-10-22 PROCEDURE — 96372 THER/PROPH/DIAG INJ SC/IM: CPT

## 2024-10-22 PROCEDURE — 2580000003 HC RX 258: Performed by: ORTHOPAEDIC SURGERY

## 2024-10-22 PROCEDURE — 97110 THERAPEUTIC EXERCISES: CPT

## 2024-10-22 RX ORDER — RIVAROXABAN 10 MG/1
10 TABLET, FILM COATED ORAL
Qty: 12 TABLET | Refills: 0 | Status: SHIPPED | OUTPATIENT
Start: 2024-10-22

## 2024-10-22 RX ORDER — HYDROCODONE BITARTRATE AND ACETAMINOPHEN 5; 325 MG/1; MG/1
1-2 TABLET ORAL EVERY 6 HOURS PRN
Qty: 40 TABLET | Refills: 0 | Status: SHIPPED | OUTPATIENT
Start: 2024-10-22 | End: 2024-10-29

## 2024-10-22 RX ADMIN — ENOXAPARIN SODIUM 30 MG: 100 INJECTION SUBCUTANEOUS at 08:32

## 2024-10-22 RX ADMIN — ACETAMINOPHEN 650 MG: 325 TABLET ORAL at 00:15

## 2024-10-22 RX ADMIN — Medication 2000 UNITS: at 08:32

## 2024-10-22 RX ADMIN — HYDROCODONE BITARTRATE AND ACETAMINOPHEN 1 TABLET: 5; 325 TABLET ORAL at 11:04

## 2024-10-22 RX ADMIN — CHLORTHALIDONE 25 MG: 25 TABLET ORAL at 08:32

## 2024-10-22 RX ADMIN — LEVOTHYROXINE SODIUM 137 MCG: 25 TABLET ORAL at 08:31

## 2024-10-22 RX ADMIN — ACETAMINOPHEN 650 MG: 325 TABLET ORAL at 08:30

## 2024-10-22 RX ADMIN — SODIUM CHLORIDE, PRESERVATIVE FREE 10 ML: 5 INJECTION INTRAVENOUS at 08:32

## 2024-10-22 RX ADMIN — CITALOPRAM 20 MG: 20 TABLET, FILM COATED ORAL at 08:32

## 2024-10-22 RX ADMIN — ATENOLOL 100 MG: 50 TABLET ORAL at 08:32

## 2024-10-22 RX ADMIN — METFORMIN HYDROCHLORIDE 500 MG: 500 TABLET, EXTENDED RELEASE ORAL at 08:32

## 2024-10-22 RX ADMIN — CEFAZOLIN 2000 MG: 2 INJECTION, POWDER, FOR SOLUTION INTRAVENOUS at 00:13

## 2024-10-22 RX ADMIN — FERROUS SULFATE TAB 325 MG (65 MG ELEMENTAL FE) 325 MG: 325 (65 FE) TAB at 08:31

## 2024-10-22 RX ADMIN — HYDROCODONE BITARTRATE AND ACETAMINOPHEN 1 TABLET: 5; 325 TABLET ORAL at 05:27

## 2024-10-22 RX ADMIN — PANTOPRAZOLE SODIUM 40 MG: 40 TABLET, DELAYED RELEASE ORAL at 08:31

## 2024-10-22 ASSESSMENT — PAIN SCALES - GENERAL
PAINLEVEL_OUTOF10: 3
PAINLEVEL_OUTOF10: 5
PAINLEVEL_OUTOF10: 4
PAINLEVEL_OUTOF10: 6

## 2024-10-22 ASSESSMENT — PAIN - FUNCTIONAL ASSESSMENT: PAIN_FUNCTIONAL_ASSESSMENT: ACTIVITIES ARE NOT PREVENTED

## 2024-10-22 ASSESSMENT — PAIN DESCRIPTION - ORIENTATION
ORIENTATION: LEFT
ORIENTATION: LEFT

## 2024-10-22 ASSESSMENT — PAIN DESCRIPTION - DESCRIPTORS: DESCRIPTORS: ACHING

## 2024-10-22 ASSESSMENT — PAIN DESCRIPTION - LOCATION
LOCATION: KNEE
LOCATION: KNEE

## 2024-10-22 NOTE — DISCHARGE INSTR - DIET

## 2024-10-22 NOTE — FLOWSHEET NOTE
IVL removed . Dressing applied. Dressed to go home.  Reviewed discharge instructions with patient and her . Voices understanding. To front entrance per w/c for discharge home.

## 2024-10-22 NOTE — PROGRESS NOTES
NP updated on request for stool softener. Pt states she had constipation with last knee surgery d/t meds. Pt states she took red stool softener.

## 2024-10-22 NOTE — PROGRESS NOTES
Physical Therapy  Facility/Department: [facility] MED SURG  Daily Treatment Note  NAME: Litzy Lou  : 1955  MRN: [id number]    Date of Service: 10/22/2024    Discharge Recommendations:  Outpatient PT ([facility]  10-23-24.)        Patient Diagnosis(es):  L TKR WBAT      Assessment  Activity Tolerance: Patient tolerated treatment well    Plan       Restrictions  Restrictions/Precautions  Restrictions/Precautions: Fall Risk, General Precautions  Required Braces or Orthoses?: No     Subjective   Subjective  Pain: PAIN L KNEE 3/10  Orientation  Overall Orientation Status: Within Normal Limits  Cognition  Overall Cognitive Status: WNL    Objective  Vitals     Bed Mobility Training  Bed Mobility Training: Yes  Overall Level of Assistance: Independent  Interventions: Demonstration  Rolling: Independent  Supine to Sit: Independent  Sit to Supine: Independent  Scooting: Independent  Balance  Sitting: Intact  Standing: Intact  Transfer Training  Transfer Training: Yes  Overall Level of Assistance: Modified independent  Interventions: Demonstration  Sit to Stand: Modified independent  Stand to Sit: Modified independent  Stand Pivot Transfers: Modified independent  Bed to Chair: Modified independent  Toilet Transfer: Supervision  Gait Training  Right Side Weight Bearing: As tolerated  Left Side Weight Bearing: As tolerated  Gait  Gait Training: Yes  Left Side Weight Bearing: As tolerated  Right Side Weight Bearing: As tolerated  Overall Level of Assistance: Modified independent  Distance (ft): 150 Feet  Assistive Device: Walker, rolling  Interventions: Demonstration  Base of Support: Widened  Rail Use: Both  Stairs - Level of Assistance: Modified independent  Number of Stairs Trained: 4     PT Exercises  Exercise Treatment: THER EX B LE.            Goals  Short Term Goals  Time Frame for Short Term Goals: 10 days  Short Term Goal 1: Patient will ambulate 50' with FWW, supervision, without LOB  Short Term Goal 2:

## 2024-10-22 NOTE — CARE COORDINATION
10/22/24 0942   IMM Letter   Observation Status Letter date given: 10/22/24   Observation Status Letter time given: 0930   Observation Status Letter given to Patient/Family/Significant other/Guardian/POA/by: To patient/LILIBETH Tejada case manager.

## 2024-10-22 NOTE — PROGRESS NOTES
Patient declined bath today. Stated she will shower as soon as she gets home. Patient given [facility] soap and educated.

## 2024-10-22 NOTE — FLOWSHEET NOTE
Left knee dressing removed. Incision well approximated with sutures and steri strips applied. No drainage noted. Aquacel dressing applied. Hemovac drain removed. Pressure dressing applied. Tolerated well. EDITA hose applied to left leg.

## 2024-10-22 NOTE — PROGRESS NOTES
Nurse informed that pt had blood on gown after ambulating to the br. Left knee assessed. Incision intact with steri strips, no bleeding noted. Drain insertions sites with no active bleeding. Blood noted in canister. New dressing applied. Pt assisted to bed with no complaints. Bed alarm on and call light in reach.

## 2024-10-22 NOTE — DISCHARGE SUMMARY
(240 Mg) MG tablet  Commonly known as: MAG-OX     metFORMIN 500 MG extended release tablet  Commonly known as: GLUCOPHAGE-XR     MULTIVITAMIN ADULT PO     omeprazole 20 MG delayed release capsule  Commonly known as: PRILOSEC     pravastatin 40 MG tablet  Commonly known as: PRAVACHOL     tiZANidine 4 MG tablet  Commonly known as: ZANAFLEX     VITAMIN D PO               Where to Get Your Medications        These medications were sent to [facility] [id number] [address] [facility] [phone] -  [phone]  [address] [id number]      Phone: [phone]   HYDROcodone-acetaminophen 5-325 MG per tablet  Xarelto 10 MG Tabs tablet       Activity: Physical Therapy  Wound Care: keep wound clean and dry, shower after 4 days if no drainage  Other:      Follow-up with AURELIA CARRERA MD in 2 weeks.    Signed:  AURELIA CARRERA MD, M.D.  10/22/2024  8:44 AM

## 2024-10-22 NOTE — CONSULTS
Rosa Tori, APRN-CNP -- Hospitalist  Progress Note 10/22/24    SUBJECTIVE:    Patient seen for f/u of post op medical management of HTN, DM.   She is POD # 1 s/p left TKR.  Her pain is well controlled.  She denies nausea and vomiting.  Last BM was prior to surgery.  She denies any chest pain, palpitations or shortness of breath.  She is doing well with PT    ROS:   Constitutional: negative  for fevers, and negative for chills.  Respiratory: negative for shortness of breath, negative for cough, and negative for wheezing  Cardiovascular: negative for chest pain, and negative for palpitations  Gastrointestinal: negative for abdominal pain, negative for nausea,negative for vomiting, negative for diarrhea, and negative for constipation    OBJECTIVE:    Vitals:   Temp: 97.3 °F (36.3 °C)  BP: 131/67  Respirations: 18  Pulse: 73  SpO2: 99 %    24HR INTAKE/OUTPUT:    Intake/Output Summary (Last 24 hours) at 10/22/2024 0810  Last data filed at 10/22/2024 0717  Gross per 24 hour   Intake 4489.76 ml   Output 2400 ml   Net 2089.76 ml      -----------------------------------------------------------------    Exam:  GEN:    Awake, alert and oriented x3.   EYES:  EOMI, pupils equal   NECK: Supple. No lymphadenopathy.  No carotid bruit  CVS:    regular rate and rhythm, no audible murmur  PULM:  CTA, no wheezes, rales or rhonchi, no acute respiratory distress  ABD:    Bowels sounds normal.  Abdomen is soft.  No distention.  no tenderness to palpation.   EXT:   no edema bilaterally .  No calf tenderness.   NEURO: Moves all extremities.  Motor and sensory are grossly intact   SKIN:  Incision is dressed with minimal drainage.     Diagnostic Data:  Lab Results   Component Value Date    HGB 10.0 (L) 10/22/2024      Lab Results   Component Value Date    GLUCOSE 98 10/01/2024    BUN 14 10/01/2024    CREATININE 0.7 10/01/2024     10/01/2024    K 3.6 (L) 10/01/2024    CALCIUM 9.3 10/01/2024    CL 98 10/01/2024    CO2 28 10/01/2024

## 2024-10-22 NOTE — DISCHARGE INSTR - ACTIVITY
Ambulate with walker  May shower Friday using the antibacterial soap . Keep left knee dressing clean, dry and intact.   Ice to knee incision as needed for pain and swelling.

## 2024-10-22 NOTE — PROGRESS NOTES
Progress Note    Subjective:     Post-Operative Day: 1 Status Post left Total Knee Arthroplasty  Systemic or Specific Complaints:No Complaints    Objective:     Patient Vitals for the past 24 hrs:   BP Temp Temp src Pulse Resp SpO2 Weight   10/22/24 0724 131/67 97.3 °F (36.3 °C) Temporal 73 18 99 % --   10/22/24 0326 (!) 119/51 -- -- 72 -- -- --   10/22/24 0310 -- -- -- -- -- -- 110.1 kg (242 lb 10.7 oz)   10/21/24 2115 115/60 -- -- 77 18 -- --   10/21/24 1842 96/61 98.2 °F (36.8 °C) Temporal 86 16 93 % --   10/21/24 1430 111/82 -- -- 71 18 97 % --   10/21/24 1330 107/61 -- -- 70 18 96 % --   10/21/24 1300 119/68 -- -- 73 18 96 % --   10/21/24 1231 106/76 -- -- 68 18 97 % --   10/21/24 1200 (!) 124/56 97.1 °F (36.2 °C) Temporal 65 18 96 % --   10/21/24 1145 122/65 -- -- 66 18 97 % --   10/21/24 1140 129/72 -- -- 66 18 96 % --   10/21/24 1135 125/65 -- -- 68 16 96 % --   10/21/24 1132 125/73 96.9 °F (36.1 °C) Temporal 66 16 95 % --       General: alert, appears stated age, and cooperative   Wound: Wound clean and dry no evidence of infection.   Motion: Painless Range of Motion   DVT Exam: No evidence of DVT seen on physical exam.       Knee swollen but thigh soft to palpation. Moving foot and ankle. Good distal pulses.      Data Review  CBC:   Lab Results   Component Value Date/Time    WBC 7.8 10/01/2024 11:00 AM    RBC 4.75 10/01/2024 11:00 AM    HGB 10.0 10/22/2024 05:55 AM    HCT 30.6 10/22/2024 05:55 AM     10/01/2024 11:00 AM       Assessment:     Status Post left Total Knee Arthroplasty. Doing well postoperatively.     Plan:      1: Discharge today, Return to Clinic: 2 weeks :  2:  Continue Deep venous thrombosis prophylaxis  3:  Continue physical therapy  4:  Continue Pain Control

## 2024-10-22 NOTE — FLOWSHEET NOTE
Awake. In chair at bedside. Vitals checked and assessment completed. States pain better with pain medication given earlier. Ace dressing clean , dry and intact. Good circulation checks. PT in to walk with patient.

## 2024-10-22 NOTE — PROGRESS NOTES
Occupational Therapy  Facility/Department: [facility] MED SURG  Daily Treatment Note  NAME: Litzy Lou  : 1955  MRN: [id number]    Date of Service: 10/22/2024    Discharge Recommendations:  Continue to assess pending progress, Home with assist PRN         Patient Diagnosis(es): The encounter diagnosis was S/P TKR (total knee replacement), left.     Assessment   Activity Tolerance: Patient tolerated treatment well  Discharge Recommendations: Continue to assess pending progress;Home with assist PRN     Plan  Occupational Therapy Plan  Times Per Day: Once a day  Days Per Week: 7 Days  Current Treatment Recommendations: Balance training;Functional mobility training;Safety education & training;Patient/Caregiver education & training;Equipment evaluation, education, & procurement;Endurance training;Self-Care / ADL;Home management training    Restrictions  Restrictions/Precautions  Restrictions/Precautions: Fall Risk;General Precautions    Subjective  Subjective  Subjective: Pt sitting up in bedside chair upon arrival. pt agreed to participate in therapy session.  Pain: Pt reported 4/10 L knee pain this date.         Objective  Vitals       ADL  LE Dressing: Minimal assistance  LE Dressing Skilled Clinical Factors: Pt educate on LB Dressing AE of LH reacher, sock aid, and Easy slide with G understanding.  Additional Comments: Pt declined self care at this time stating \"I'm going to take a shower when I get home\".        Safety Devices  Type of Devices: All fall risk precautions in place;Call light within reach;Chair alarm in place;Left in chair    Patient Education  Education Given To: Patient  Education Provided: Role of Therapy;Plan of Care  Education Provided Comments: Pt educated on d/c folder, AE/DME and safety with G understanding.  Education Method: Verbal  Barriers to Learning: None  Education Outcome: Verbalized understanding    Goals  Short Term Goals  Time Frame for Short Term Goals: 21 visits  Short

## 2024-10-24 ENCOUNTER — HOSPITAL ENCOUNTER (OUTPATIENT)
Dept: PHYSICAL THERAPY | Age: 69
Setting detail: THERAPIES SERIES
Discharge: HOME | End: 2024-10-24
Payer: COMMERCIAL

## 2024-10-24 PROCEDURE — 97161 PT EVAL LOW COMPLEX 20 MIN: CPT

## 2024-10-24 PROCEDURE — 97110 THERAPEUTIC EXERCISES: CPT

## 2024-10-24 PROCEDURE — G0283 ELEC STIM OTHER THAN WOUND: HCPCS

## 2024-10-24 ASSESSMENT — KOOS JR
HOW SEVERE IS YOUR KNEE STIFFNESS AFTER FIRST WAKING IN MORNING: SEVERE
BENDING TO THE FLOOR TO PICK UP OBJECT: EXTREME
KOOS JR TOTAL INTERVAL SCORE: 39.625
STRAIGHTENING KNEE FULLY: SEVERE
STANDING UPRIGHT: MODERATE
TWISING OR PIVOTING ON KNEE: SEVERE
GOING UP OR DOWN STAIRS: MODERATE
RISING FROM SITTING: MODERATE

## 2024-10-24 NOTE — THERAPY EVALUATION
strength, Decreased balance  Assessment: Pt was referred for s/p L TKR Z96.652 with PMHx of OA, mitral valve prolapse, and R TKR (5/2024). Pt pain is moderate to severe and she is ambulating with a SW. Pt is limited in ROM, strength, and functional mobility. Pt scored on KOOS-JR total survey = 19 and Total Interval Score = 39.625.  Therapy Prognosis: Good        Decision Making: Low Complexity    Patient Education  PT POC, technique with ex   Pt verbalized/demonstrated good understanding:     [X] Yes         [] No, pt required further clarification.      Goals  Short Term Goals  Time Frame for Short Term Goals: 2 weeks  Short Term Goal 1: Initiate HEP and progress as tolerated for strength and mobility.  Short Term Goal 2: Pt will demonstrate full active extension of the L knee for good heel strike.  Short Term Goal 3: Pt will report no greater than 5/10 pain in the L knee with ADLs for better tolerance.    Long Term Goals  Time Frame for Long Term Goals : 6 weeks  Long Term Goal 1: Pt will be independent and compliant with her HEP for strength and mobility.  Long Term Goal 2: Pt will report 75% improvement functionally and return to most desired activities.  Long Term Goal 3: Pt strength will be 4+/5 in the L knee to improve stability with mobility.  Long Term Goal 4: Pt AROM of L knee will be 0 to 100* for improved transition sit to stand and negotiation of stairs in reciprocal pattern.      Patient Goals : get my knee moving and bending      Minutes Tracking:  Time In: 1144  Time Out: 1243  Minutes: 59       Allison Gómez, PT, DPT    10/24/2024

## 2024-10-24 NOTE — PLAN OF CARE
[facility]           Phone: [phone]             Outpatient Physical Therapy  Fax: [phone]                                           Date: 10/24/2024  Patient: Litzy Luo : 1955 CSN #: [id number]   Referring Physician: Elder Montelongo MD      [x] Plan of Care   [] Updated Plan of Care    Dates of Service to Include: 10/24/2024 to 24    Diagnosis:  s/p L TKR Z96.652     Rehab (Treatment) Diagnosis:  pain in knee         Onset Date:  10/21/24    Attendance  Total # of Visits to Date: 1 No Show: 0 Canceled Appointment: 0    Assessment  Body Structures, Functions, Activity Limitations Requiring Skilled Therapeutic Intervention: Decreased functional mobility , Increased pain, Decreased endurance, Decreased ROM, Decreased strength, Decreased balance  Assessment: Pt was referred for s/p L TKR Z96.652 with PMHx of OA, mitral valve prolapse, and R TKR (2024). Pt pain is moderate to severe and she is ambulating with a SW. Pt is limited in ROM, strength, and functional mobility. Pt scored on KOOS-JR total survey = 19 and Total Interval Score = 39.625.      Goals  Short Term Goals  Time Frame for Short Term Goals: 2 weeks  Short Term Goal 1: Initiate HEP and progress as tolerated for strength and mobility.  Short Term Goal 2: Pt will demonstrate full active extension of the L knee for good heel strike.  Short Term Goal 3: Pt will report no greater than 5/10 pain in the L knee with ADLs for better tolerance.  Long Term Goals  Time Frame for Long Term Goals : 6 weeks  Long Term Goal 1: Pt will be independent and compliant with her HEP for strength and mobility.  Long Term Goal 2: Pt will report 75% improvement functionally and return to most desired activities.  Long Term Goal 3: Pt strength will be 4+/5 in the L knee to improve stability with mobility.  Long Term Goal 4: Pt AROM of L knee will be 0 to

## 2024-10-28 ENCOUNTER — HOSPITAL ENCOUNTER (OUTPATIENT)
Dept: PHYSICAL THERAPY | Age: 69
Setting detail: THERAPIES SERIES
Discharge: HOME | End: 2024-10-28
Payer: COMMERCIAL

## 2024-10-28 PROCEDURE — 97110 THERAPEUTIC EXERCISES: CPT

## 2024-10-28 PROCEDURE — G0283 ELEC STIM OTHER THAN WOUND: HCPCS

## 2024-10-28 PROCEDURE — 97116 GAIT TRAINING THERAPY: CPT

## 2024-10-28 NOTE — FLOWSHEET NOTE
given/10      Plan  Plan Frequency: 3  Plan weeks: 4       Goals  (Total # of Visits to Date: 2)      Short Term Goals  Time Frame for Short Term Goals: 2 weeks  Short Term Goal 1: Initiate HEP and progress as tolerated for strength and mobility.  Short Term Goal 2: Pt will demonstrate full active extension of the L knee for good heel strike.  Short Term Goal 3: Pt will report no greater than 5/10 pain in the L knee with ADLs for better tolerance.    Long Term Goals  Time Frame for Long Term Goals : 6 weeks  Long Term Goal 1: Pt will be independent and compliant with her HEP for strength and mobility.  Long Term Goal 2: Pt will report 75% improvement functionally and return to most desired activities.  Long Term Goal 3: Pt strength will be 4+/5 in the L knee to improve stability with mobility.  Long Term Goal 4: Pt AROM of L knee will be 0 to 100* for improved transition sit to stand and negotiation of stairs in reciprocal pattern.    Minutes Tracking:  Time In: 1245  Time Out: 1330  Minutes: 45       Gisela Mason PTA     Date: 10/28/2024

## 2024-10-30 ENCOUNTER — HOSPITAL ENCOUNTER (OUTPATIENT)
Dept: PHYSICAL THERAPY | Age: 69
Setting detail: THERAPIES SERIES
Discharge: HOME | End: 2024-10-30
Payer: COMMERCIAL

## 2024-10-30 PROCEDURE — G0283 ELEC STIM OTHER THAN WOUND: HCPCS

## 2024-10-30 PROCEDURE — 97110 THERAPEUTIC EXERCISES: CPT

## 2024-10-30 PROCEDURE — 97140 MANUAL THERAPY 1/> REGIONS: CPT

## 2024-10-30 NOTE — PROGRESS NOTES
Phone: [phone]                 [facility]           Fax: [phone]                           Outpatient Physical Therapy                                                                            Daily Note    Patient: Litzy Lou : 1955  [facility] #: [id number]   Referring Physician: Elder Montelongo MD  Date: 10/30/2024    Diagnosis: s/p L TKR Z96.652  Treatment Diagnosis: pain in knee    Onset Date: 10/21/24  PT Insurance Information: [employer]  Total # of Visits Approved: 12 Per Physician Order  Total # of Visits to Date: 3  No Show: 0  Canceled Appointment: 0    24 Plan of Care/Recert Due    Pre-Treatment Pain:  3-410  Subjective: Pt reports 3-4/10 L knee pain, stating it is more of a dull ache. Pt states she felt like her knee was moving more after last visit.    Exercises:  Exercise 1: HEP -  Exercise 2: Sci-Fit x10 min, HS stretch, calf stretch and knee flexion stretch 3x30\" ea  Exercise 3: Supine ex: Quad set x10, 5sec hold, heel slide x10  Exercise 4: Seated LAQ 2x10  Exercise 6: Kitchen sink ex x10 ea  Exercise 7: Sit to stand with 1 to no UE x10    Manual:  Joint Mobilization: PROM L knee    Modality:     Modality Flow Sheet:   Performed (X) Tx Modality   x Electrical Stim: x15 min l knee for soreness     x Cold Pack: with IFC     Assessment  Assessment: Progressed ther ex with sit to stands, pt able to complete some without UE support. PROM to L knee within tolerance for improved mobility. Pt utilized SPC throughout tx with minimal VCs for technque and mild antalgic gait pattern. CP/IFC post tx for soreness. Continue as pt tolerates.    Activity Tolerance  Activity Tolerance: Patient tolerated treatment well    Patient Education  Patient Education: HEP  Pt verbalized/demonstrated good understanding:     [x] Yes         [] No, pt required further clarification.       Post Treatment Pain:  3-410      Plan  Plan Frequency: 3  Plan weeks: 4       Goals  (Total #

## 2024-11-01 ENCOUNTER — HOSPITAL ENCOUNTER (OUTPATIENT)
Dept: PHYSICAL THERAPY | Age: 69
Setting detail: THERAPIES SERIES
Discharge: HOME | End: 2024-11-01
Payer: COMMERCIAL

## 2024-11-01 PROCEDURE — 97110 THERAPEUTIC EXERCISES: CPT

## 2024-11-01 PROCEDURE — G0283 ELEC STIM OTHER THAN WOUND: HCPCS

## 2024-11-04 ENCOUNTER — HOSPITAL ENCOUNTER (OUTPATIENT)
Dept: PHYSICAL THERAPY | Age: 69
Setting detail: THERAPIES SERIES
Discharge: HOME | End: 2024-11-04
Payer: COMMERCIAL

## 2024-11-04 NOTE — PROGRESS NOTES
Physical Therapy  [facility]  Inpatient/Observation/Outpatient Rehabilitation    Date: 2024  Patient Name: Litzy Lou       [] Inpatient Acute/Observation       [x]  Outpatient  : 1955 Plan of Care/Recert ends    [x] Pt cancelled due to:  [] No Reason Given   [x] Sick/ill   [] Other:      Therapist/Assistant will attempt to see this patient, at our earliest opportunity.     Sruthi Rangel, PTA Date: 2024

## 2024-11-06 ENCOUNTER — HOSPITAL ENCOUNTER (OUTPATIENT)
Dept: PHYSICAL THERAPY | Age: 69
Setting detail: THERAPIES SERIES
Discharge: HOME | End: 2024-11-06
Payer: COMMERCIAL

## 2024-11-06 PROCEDURE — 97110 THERAPEUTIC EXERCISES: CPT

## 2024-11-06 NOTE — PROGRESS NOTES
to Date: 5)      Short Term Goals  Time Frame for Short Term Goals: 2 weeks  Short Term Goal 1: Initiate HEP and progress as tolerated for strength and mobility.-met  Short Term Goal 2: Pt will demonstrate full active extension of the L knee for good heel strike.-progressing  Short Term Goal 3: Pt will report no greater than 5/10 pain in the L knee with ADLs for better tolerance.-met (3-4/10 at night after PT)    Long Term Goals  Time Frame for Long Term Goals : 6 weeks  Long Term Goal 1: Pt will be independent and compliant with her HEP for strength and mobility.-met  Long Term Goal 2: Pt will report 75% improvement functionally and return to most desired activities.  Long Term Goal 3: Pt strength will be 4+/5 in the L knee to improve stability with mobility.  Long Term Goal 4: Pt AROM of L knee will be 0 to 100* for improved transition sit to stand and negotiation of stairs in reciprocal pattern.-progressing    Minutes Tracking:  Time In: 1447  Time Out: 1541  Minutes: 54       Mahi Dunaway, PTA     Date: 11/6/2024

## 2024-11-11 ENCOUNTER — HOSPITAL ENCOUNTER (OUTPATIENT)
Dept: PHYSICAL THERAPY | Age: 69
Setting detail: THERAPIES SERIES
Discharge: HOME | End: 2024-11-11
Payer: COMMERCIAL

## 2024-11-11 PROCEDURE — 97140 MANUAL THERAPY 1/> REGIONS: CPT

## 2024-11-11 PROCEDURE — 97110 THERAPEUTIC EXERCISES: CPT

## 2024-11-11 PROCEDURE — G0283 ELEC STIM OTHER THAN WOUND: HCPCS

## 2024-11-11 NOTE — PROGRESS NOTES
Phone: [phone]                 [facility]           Fax: [phone]                           Outpatient Physical Therapy                                                                            Daily Note    Patient: Litzy Lou : 1955  [facility] #: [id number]   Referring Physician: Elder Montelongo MD  Date: 2024    Diagnosis: s/p L TKR Z96.652  Treatment Diagnosis: pain in knee    Onset Date: 10/21/24  PT Insurance Information: [employer]  Total # of Visits Approved: 12 Per Physician Order  Total # of Visits to Date: 6  No Show: 0  Canceled Appointment: 0    24 Plan of Care/Recert Due    Pre-Treatment Pain:  1-2/10  Subjective: Pt reports 1-2/10 L knee pain. Pt arrives to clinic without AD and mild antalgic gait pattern. Pt states she can tell that her knee is moving better.    Exercises:  Exercise 1: HEP -  Exercise 2: Sci-Fit x10 min, HS stretch, knee flexion stretch 2x30\" ea  Exercise 4: Seated LAQ with 3# 2 x15 with 3\" hold, HS curl BTB 2x15  Exercise 7: Sit to stand with no UE 2x10  Exercise 8: BTB TKE x 10 each  Exercise 9: FSU/LSU 6\" x 10 each    Manual:  Joint Mobilization: PROM L knee    Modality:     Modality Flow Sheet:   Performed (X) Tx Modality   x Electrical Stim: x15 min L knee for pain/soreness     x Cold Pack: with IFC     Assessment  Assessment: Pt with mild antalgic gait pattern, no increase in pain with exercsies. L knee ROM (2*) - 106*. CP and IFC post tx for pain and soreness. Continue as pt tolerates.    Activity Tolerance  Activity Tolerance: Patient tolerated treatment well    Patient Education  Patient Education: HEP  Pt verbalized/demonstrated good understanding:     [x] Yes         [] No, pt required further clarification.       Post Treatment Pain:  1-2/10      Plan  Plan Frequency: 3  Plan weeks: 4       Goals  (Total # of Visits to Date: 6)      Short Term Goals  Time Frame for Short Term Goals: 2 weeks  Short Term Goal 1: Initiate

## 2024-11-13 ENCOUNTER — HOSPITAL ENCOUNTER (OUTPATIENT)
Dept: PHYSICAL THERAPY | Age: 69
Setting detail: THERAPIES SERIES
Discharge: HOME | End: 2024-11-13
Payer: COMMERCIAL

## 2024-11-13 PROCEDURE — 97110 THERAPEUTIC EXERCISES: CPT

## 2024-11-13 NOTE — PROGRESS NOTES
Phone: [phone]                 [facility]           Fax: [phone]                           Outpatient Physical Therapy                                                                            Daily Note    Patient: Litzy Lou : 1955  [facility] #: [id number]   Referring Physician: Elder Montelongo MD  Date: 2024    Diagnosis: s/p L TKR Z96.652  Treatment Diagnosis: pain in knee    Onset Date: 10/21/24  PT Insurance Information: [employer]  Total # of Visits Approved: 12 Per Physician Order  Total # of Visits to Date: 7  No Show: 0  Canceled Appointment: 0    24 Plan of Care/Recert Due    Pre-Treatment Pain:  1/10  Subjective: Pt states she is doing well, no AD and reports minimal pain. 1/10 today.    Exercises:  Exercise 2: Sci-Fit x10 min, HS stretch x 60\", knee flexion stretch 2x60\" ea  Exercise 9: FSU/LSU 9\"\" x 10 each  Exercise 10: [place] retro walk 30# x 6  Exercise 11: cybex leg press 2 x 10 8pl, 10pl  Exercise 12: star trac knee ext x 10 35#   // hamstring curl x 10 55#    Assessment  Assessment: Pt with no AD and still with min-mild antalgic pattern. Addition of cybex leg press, startrac knee ext and hamstring curl this date with good tolerance from pt. Pt required min verbal cueing for proper foot placement and tempo on decent of leg press. Able to correct with cueing. Will continue to progress per tolerance.    Activity Tolerance  Activity Tolerance: Patient tolerated treatment well    Patient Education  Patient Education: HEP  Pt verbalized/demonstrated good understanding:     [x] Yes         [] No, pt required further clarification.       Post Treatment Pain:  1/10      Plan  Plan Frequency: 3  Plan weeks: 4       Goals  (Total # of Visits to Date: 7)      Short Term Goals  Time Frame for Short Term Goals: 2 weeks  Short Term Goal 1: Initiate HEP and progress as tolerated for strength and mobility.-met  Short Term Goal 2: Pt will demonstrate full

## 2024-11-15 ENCOUNTER — HOSPITAL ENCOUNTER (OUTPATIENT)
Dept: PHYSICAL THERAPY | Age: 69
Setting detail: THERAPIES SERIES
Discharge: HOME | End: 2024-11-15
Payer: COMMERCIAL

## 2024-11-15 PROCEDURE — 97110 THERAPEUTIC EXERCISES: CPT

## 2024-11-15 NOTE — PROGRESS NOTES
Phone: [phone]                 [facility]           Fax: [phone]                           Outpatient Physical Therapy                                                                            Daily Note    Patient: Litzy Lou : 1955  [facility] #: [id number]   Referring Physician: Elder Montelongo MD  Date: 11/15/2024    Diagnosis: s/p L TKR Z96.652  Treatment Diagnosis: pain in knee    Onset Date: 10/21/24  PT Insurance Information: [employer]  Total # of Visits Approved: 12 Per Physician Order  Total # of Visits to Date: 8  No Show: 0  Canceled Appointment: 0    24 Plan of Care/Recert Due    Pre-Treatment Pain:  0-1/10  Subjective: Pt reports discomfort but not pain today. Pt states she has noticed walking and getting comfortable is getting easier.    Exercises:  Exercise 1: HEP -  Exercise 2: Sci-Fit x10 min, HS stretch x 60\", knee flexion stretch 2x60\" ea  Exercise 5: TRX butt taps on black step with 3\" step on top x10  Exercise 7: Sit to stand 8# 2x10  Exercise 9: FSU/LSU 6\" x 10 ea, SD 4\" x10  Exercise 10: Nevaeh retro walk 20# x 10  Exercise 11: cybex leg press 2 x 10 8pl  Exercise 12: star trac knee ext x 10 35#   // hamstring curl x 10 55#    Modality:     Modality Flow Sheet:   Performed (X) Tx Modality   x Cold Pack: x5 min L knee for discomfort     Assessment  Assessment: Continued to progress ther ex per pt tolerance. Pt required minimal VCs for technique throughout tx. Pt able to complete TRX squats to black box but had increase in pain, added 3\" step with no pain. CP post tx for soreness. Continue as pt tolerates.    Activity Tolerance  Activity Tolerance: Patient tolerated treatment well    Patient Education  Patient Education: HEP  Pt verbalized/demonstrated good understanding:     [x] Yes         [] No, pt required further clarification.       Post Treatment Pain:  0-1/10      Plan  Plan Frequency: 3  Plan weeks: 4       Goals  (Total # of Visits to

## 2024-11-18 ENCOUNTER — HOSPITAL ENCOUNTER (OUTPATIENT)
Dept: PHYSICAL THERAPY | Age: 69
Setting detail: THERAPIES SERIES
Discharge: HOME | End: 2024-11-18
Payer: COMMERCIAL

## 2024-11-18 PROCEDURE — 97110 THERAPEUTIC EXERCISES: CPT

## 2024-11-18 PROCEDURE — 97140 MANUAL THERAPY 1/> REGIONS: CPT

## 2024-11-18 NOTE — PROGRESS NOTES
Phone: [phone]                 [facility]           Fax: [phone]                           Outpatient Physical Therapy                                                                            Daily Note    Patient: Litzy Lou : 1955  [facility] #: [id number]   Referring Physician: Elder Montelongo MD  Date: 2024    Diagnosis: s/p L TKR Z96.652  Treatment Diagnosis: pain in knee    Onset Date: 10/21/24  PT Insurance Information: [employer]  Total # of Visits Approved: 12 Per Physician Order  Total # of Visits to Date: 9  No Show: 0  Canceled Appointment: 0    24 Plan of Care/Recert Due    Pre-Treatment Pain:  0-1/10  Subjective: Pt reports mild discomfort and soreness from the weekend. Pt states she did a lot of walking this weekend.    Exercises:  Exercise 2: Sci-Fit x10 min, HS stretch x 60\", knee flexion stretch 2x60\" ea - no stretches today  Exercise 5: TRX butt taps on black step with 3\" step on top 2x10  Exercise 7: Sit to stand 8# 2x10  Exercise 9: FSU/LSU 6\" x 15 ea, SD 4\" x15  Exercise 10: Nevaeh retro walk 25# x 10  Exercise 11: cybex leg press DL x 10 8pl, SL x10 6 pl  Exercise 12: star trac knee ext 2x 10 35#   // hamstring curl 2x 10 55#  Exercise 13: Partial lunges x10 ea    Manual:  Joint Mobilization: PROM L knee    Modality:     Modality Flow Sheet:   Performed (X) Tx Modality   x Cold Pack: x10 min L knee for soreness     Assessment  Assessment: Progressed ther ex with increased reps this visit, no increase in pain. L knee AROM 101*, 109* with overpressure. CP post tx for soreness. Conttinue as pt tolerates.    Activity Tolerance  Activity Tolerance: Patient tolerated treatment well    Patient Education  Patient Education: HEP  Pt verbalized/demonstrated good understanding:     [x] Yes         [] No, pt required further clarification.       Post Treatment Pain:  0-1/10      Plan  Plan Frequency: 3  Plan weeks: 4       Goals  (Total # of Visits to

## 2024-11-20 ENCOUNTER — APPOINTMENT (OUTPATIENT)
Dept: PHYSICAL THERAPY | Age: 69
End: 2024-11-20
Payer: COMMERCIAL

## 2024-11-22 ENCOUNTER — HOSPITAL ENCOUNTER (OUTPATIENT)
Dept: PHYSICAL THERAPY | Age: 69
Setting detail: THERAPIES SERIES
Discharge: HOME | End: 2024-11-22
Payer: COMMERCIAL

## 2024-11-22 PROCEDURE — 97110 THERAPEUTIC EXERCISES: CPT

## 2024-11-22 NOTE — PROGRESS NOTES
Phone: [phone]                 [facility]           Fax: [phone]                           Outpatient Physical Therapy                                                                            Daily Note    Patient: Litzy Lou : 1955  [facility] #: [id number]   Referring Physician: Elder Montelongo MD  Date: 2024    Diagnosis: s/p L TKR Z96.652  Treatment Diagnosis: pain in knee    Onset Date: 10/21/24  PT Insurance Information: [employer]  Total # of Visits Approved: 12 Per Physician Order  Total # of Visits to Date: 10  No Show: 0  Canceled Appointment: 0    24 Plan of Care/Recert Due    Pre-Treatment Pain:  2/10  Subjective: Pt doing well this date, has minor pain and rates it as 2/10 at most.    Exercises:  Exercise 2: Sci-Fit x10 min, HS stretch 2 x 60\", knee flexion stretch 2x60\" ea  Exercise 3: measures obtained for physician update/ reassessment  Exercise 4: knee ext guerra at pt tolerance x 5 min  Exercise 7: Sit to stand 15# 2x10  // seated hamstring stetch 2 x 60\"  Exercise 9: FSU/LSU 8\" x 15 ea,        Assessment  Assessment: Pt has completed an initial eval and 9 follow up visits for L TKA. Pt has met all STG's and all LTG's except for knee ROM. Currently at 5-110* after stretching. Pt would benefit from skilled therapy to gain full knee ROM for ease with ambulation and to prevent gait abnomalities that may turn into hip and/or back pain. Rest of session focused on L knee functional strength and stretching into ext. Pt requires min verbal cueing for foot placement and stepping pattern with FSU/LSU and SD. Will continue to progress.    Activity Tolerance  Activity Tolerance: Patient tolerated treatment well    Patient Education  Patient Education: POC, stretching at home  Pt verbalized/demonstrated good understanding:     [x] Yes         [] No, pt required further clarification.       Post Treatment Pain:  2/10      Plan  Plan Frequency: 2  Plan weeks:

## 2024-11-22 NOTE — PROGRESS NOTES
[facility]           Phone: [phone]             Outpatient Physical Therapy  Fax: [phone]                                           Date: 2024  Patient: Litzy Lou : 1955 CSN #: [id number]   Referring Physician: Elder Montelongo MD      [] Plan of Care   [x] Updated Plan of Care    Dates of Service to Include: 2024 to 24    Diagnosis:  s/p L TKR Z96.652     Rehab (Treatment) Diagnosis:  pain in knee         Onset Date:  10/21/24    Attendance  Total # of Visits to Date: 10 No Show: 0 Canceled Appointment: 0    Assessment  Assessment: Pt has completed an initial eval and 9 follow up visits for L TKA. Pt has met all STG's and all LTG's except for knee ROM. Currently at 5-110* after stretching. Pt would benefit from skilled therapy to gain full knee ROM for ease with ambulation and to prevent gait abnomalities that may turn into hip and/or back pain. Rest of session focused on L knee functional strength and stretching into ext. Pt requires min verbal cueing for foot placement and stepping pattern with FSU/LSU and SD. Will continue to progress.      Goals  Short Term Goals  Time Frame for Short Term Goals: 2 weeks  Short Term Goal 1: Initiate HEP and progress as tolerated for strength and mobility.-met  Short Term Goal 2: Pt will demonstrate full active extension of the L knee for good heel strike.-progressing  Short Term Goal 3: Pt will report no greater than 5/10 pain in the L knee with ADLs for better tolerance.-met (3-4/10 at night after PT)  Long Term Goals  Time Frame for Long Term Goals : 6 weeks  Long Term Goal 1: Pt will be independent and compliant with her HEP for strength and mobility.-met  Long Term Goal 2: Pt will report 75% improvement functionally and return to most desired activities. - met (80-85% improvement 11/15)  Long Term Goal 3: Pt strength will be 4+/5 in the

## 2024-11-26 ENCOUNTER — HOSPITAL ENCOUNTER (OUTPATIENT)
Dept: PHYSICAL THERAPY | Age: 69
Setting detail: THERAPIES SERIES
Discharge: HOME | End: 2024-11-26
Payer: COMMERCIAL

## 2024-11-26 NOTE — PROGRESS NOTES
[facility]  Inpatient/Observation/Outpatient Rehabilitation    Date: 2024  Patient Name: Litzy Lou       [] Inpatient Acute/Observation       [x]  Outpatient  : 1955     Plan of Care/Recert ends    [x] Pt no showed for scheduled appointment    [x] As a reminder, pt was contacted/attempted contact via phone of upcoming appointments. Therapist left voicemail of pt. xext appointment on Tuesday 12/3/24 at 1pm.      Therapist/Assistant will attempt to see this patient, at our earliest opportunity.       Rosa Lopez, PTA Date: 2024

## 2024-12-12 ENCOUNTER — HOSPITAL ENCOUNTER (OUTPATIENT)
Age: 69
Discharge: HOME | End: 2024-12-14
Payer: COMMERCIAL

## 2024-12-12 VITALS
BODY MASS INDEX: 45.83 KG/M2 | HEIGHT: 61 IN | WEIGHT: 242.73 LBS | SYSTOLIC BLOOD PRESSURE: 131 MMHG | DIASTOLIC BLOOD PRESSURE: 67 MMHG

## 2024-12-12 DIAGNOSIS — R06.09 EXERTIONAL DYSPNEA: ICD-10-CM

## 2024-12-12 LAB
ECHO AO SINUS VALSALVA DIAM: 3 CM
ECHO AO SINUS VALSALVA INDEX: 1.46 CM/M2
ECHO AO ST JNCT DIAM: 2.4 CM
ECHO AR MAX VEL PISA: 4.1 M/S
ECHO AV CUSP MM: 1.4 CM
ECHO AV MEAN GRADIENT: 7 MMHG
ECHO AV MEAN VELOCITY: 1.2 M/S
ECHO AV PEAK GRADIENT: 13 MMHG
ECHO AV PEAK VELOCITY: 1.8 M/S
ECHO AV REGURGITANT PHT: 648 MS
ECHO AV VELOCITY RATIO: 0.5
ECHO AV VTI: 39.8 CM
ECHO BSA: 2.18 M2
ECHO EST RA PRESSURE: 3 MMHG
ECHO LA AREA 2C: 26.9 CM2
ECHO LA AREA 4C: 20.8 CM2
ECHO LA MAJOR AXIS: 6 CM
ECHO LA MINOR AXIS: 6.3 CM
ECHO LA VOL BP: 77 ML (ref 22–52)
ECHO LA VOL MOD A2C: 94 ML (ref 22–52)
ECHO LA VOL MOD A4C: 60 ML (ref 22–52)
ECHO LA VOL/BSA BIPLANE: 38 ML/M2 (ref 16–34)
ECHO LA VOLUME INDEX MOD A2C: 46 ML/M2 (ref 16–34)
ECHO LA VOLUME INDEX MOD A4C: 29 ML/M2 (ref 16–34)
ECHO LV E' LATERAL VELOCITY: 10.4 CM/S
ECHO LV EDV A2C: 63 ML
ECHO LV EDV A4C: 70 ML
ECHO LV EDV INDEX A4C: 34 ML/M2
ECHO LV EDV NDEX A2C: 31 ML/M2
ECHO LV EJECTION FRACTION A2C: 62 %
ECHO LV EJECTION FRACTION A4C: 71 %
ECHO LV EJECTION FRACTION BIPLANE: 60 % (ref 55–100)
ECHO LV ESV A2C: 24 ML
ECHO LV ESV A4C: 20 ML
ECHO LV ESV INDEX A2C: 12 ML/M2
ECHO LV ESV INDEX A4C: 10 ML/M2
ECHO LV FRACTIONAL SHORTENING: 42 % (ref 28–44)
ECHO LV INTERNAL DIMENSION DIASTOLE INDEX: 2.59 CM/M2
ECHO LV INTERNAL DIMENSION DIASTOLIC: 5.3 CM (ref 3.9–5.3)
ECHO LV INTERNAL DIMENSION SYSTOLIC INDEX: 1.51 CM/M2
ECHO LV INTERNAL DIMENSION SYSTOLIC: 3.1 CM
ECHO LV IVSD: 1 CM (ref 0.6–0.9)
ECHO LV MASS 2D: 200.4 G (ref 67–162)
ECHO LV MASS INDEX 2D: 97.8 G/M2 (ref 43–95)
ECHO LV POSTERIOR WALL DIASTOLIC: 1 CM (ref 0.6–0.9)
ECHO LV RELATIVE WALL THICKNESS RATIO: 0.38
ECHO LVOT AV VTI INDEX: 0.53
ECHO LVOT MEAN GRADIENT: 2 MMHG
ECHO LVOT PEAK GRADIENT: 4 MMHG
ECHO LVOT PEAK VELOCITY: 0.9 M/S
ECHO LVOT VTI: 20.9 CM
ECHO MV A VELOCITY: 0.87 M/S
ECHO MV E DECELERATION TIME (DT): 259 MS
ECHO MV E VELOCITY: 0.74 M/S
ECHO MV E/A RATIO: 0.85
ECHO MV E/E' LATERAL: 7.12
ECHO PV MAX VELOCITY: 0.9 M/S
ECHO PV PEAK GRADIENT: 3 MMHG
ECHO RIGHT VENTRICULAR SYSTOLIC PRESSURE (RVSP): 32 MMHG
ECHO TV REGURGITANT MAX VELOCITY: 2.67 M/S
ECHO TV REGURGITANT PEAK GRADIENT: 29 MMHG

## 2024-12-12 PROCEDURE — 93306 TTE W/DOPPLER COMPLETE: CPT

## 2025-01-15 ENCOUNTER — HOSPITAL ENCOUNTER (OUTPATIENT)
Dept: WOMENS IMAGING | Age: 70
Discharge: HOME | End: 2025-01-17
Payer: COMMERCIAL

## 2025-01-15 DIAGNOSIS — Z78.0 MENOPAUSE: ICD-10-CM

## 2025-01-15 PROCEDURE — 77080 DXA BONE DENSITY AXIAL: CPT

## 2025-07-07 ENCOUNTER — HOSPITAL ENCOUNTER (OUTPATIENT)
Dept: WOMENS IMAGING | Age: 70
Discharge: HOME | End: 2025-07-09
Payer: COMMERCIAL

## 2025-07-07 VITALS — HEIGHT: 61 IN | BODY MASS INDEX: 45.31 KG/M2 | WEIGHT: 240 LBS

## 2025-07-07 DIAGNOSIS — Z12.39 SCREENING BREAST EXAMINATION: ICD-10-CM

## 2025-07-07 PROCEDURE — 77063 BREAST TOMOSYNTHESIS BI: CPT

## (undated) DEVICE — SOLUTION IV 50ML 0.9% SOD CHL PLAS CONT USP VIAFLX

## (undated) DEVICE — SUTURE VICRYL + SZ 2-0 L36IN ABSRB UD L36MM CT-1 1/2 CIR VCP945H

## (undated) DEVICE — SUTURE VICRYL ABSORBABLE BRAIDED 2-0 CT 36 IN DA UD  VCP957H

## (undated) DEVICE — SUTURE MONOCRYL SZ 3-0 L27IN ABSRB UD L24MM PS-1 3/8 CIR PRIM Y936H

## (undated) DEVICE — MERCY TIFFIN TOTAL KNEE: Brand: MEDLINE INDUSTRIES, INC.

## (undated) DEVICE — SOLUTION IRRIG 3000ML 0.9% SOD CHL USP UROMATIC PLAS CONT

## (undated) DEVICE — SOLUTION IRRIG 1000ML 0.9% SOD CHL USP POUR PLAS BTL

## (undated) DEVICE — PENCIL SMK EVAC L10FT DIA95MM TBNG NONSTICK W ADPT TO 22MM

## (undated) DEVICE — SYRINGE MED 10ML TRNSLUC BRL PLUNG BLK MRK POLYPR CTRL

## (undated) DEVICE — SPONGE GZ W4XL4IN COT 12 PLY TYP VII WVN C FLD DSGN STERILE

## (undated) DEVICE — CANNULA ORAL NSL AD CO2 N INTUB O2 DEL DISP TRU LNK

## (undated) DEVICE — 3 BONE CEMENT MIXER WITH SMALL SPATULA: Brand: MIXEVAC

## (undated) DEVICE — GLOVE ORANGE PI 8   MSG9080

## (undated) DEVICE — KIT EVAC 0.13IN RECT TB DIA10FR 400CC PVC 3 SPR Y CONN DRN

## (undated) DEVICE — PENCIL SMK EVAC TELSCP 3 M TBNG

## (undated) DEVICE — NEEDLE 25GAX5.0MM INJ CARR LOCKE

## (undated) DEVICE — INSTRUMENT SCREW BNE L48MM CONSTRN CNDYL KNEE HEX HD STEM FOR LEG NXGN

## (undated) DEVICE — SUTURE VICRYL SZ 1 L36IN ABSRB UD L36MM CT-1 1/2 CIR J947H

## (undated) DEVICE — STERILE PATIENT PROTECTIVE PAD FOR IMP® KNEE POSITIONERS & COHESIVE WRAP (10 / CASE): Brand: DE MAYO KNEE POSITIONER®

## (undated) DEVICE — GLOVE SURG SZ 75 L12IN FNGR THK79MIL GRN LTX FREE

## (undated) DEVICE — CLEANER,CAUTERY TIP,2X2",STERILE: Brand: MEDLINE

## (undated) DEVICE — SOLUTION IV IRRIG POUR BRL 0.9% SODIUM CHL 2F7124

## (undated) DEVICE — MEDI-VAC NON-CONDUCTIVE TUBING7MM X 30.5 (100FT): Brand: CARDINAL HEALTH

## (undated) DEVICE — DECANTER BAG 9": Brand: MEDLINE INDUSTRIES, INC.

## (undated) DEVICE — SCREW BNE L48MM CONSTRN CNDYL KNEE HEX HD STEM FOR LEG NXGN
Type: IMPLANTABLE DEVICE | Site: KNEE | Status: NON-FUNCTIONAL
Removed: 2024-05-02